# Patient Record
Sex: FEMALE | Race: WHITE | NOT HISPANIC OR LATINO | Employment: FULL TIME | URBAN - NONMETROPOLITAN AREA
[De-identification: names, ages, dates, MRNs, and addresses within clinical notes are randomized per-mention and may not be internally consistent; named-entity substitution may affect disease eponyms.]

---

## 2017-04-11 ENCOUNTER — HOSPITAL ENCOUNTER (OUTPATIENT)
Dept: ULTRASOUND IMAGING | Facility: HOSPITAL | Age: 47
Discharge: HOME | End: 2017-04-11
Admitting: NURSE PRACTITIONER

## 2017-04-11 ENCOUNTER — TRANSCRIBE ORDERS (OUTPATIENT)
Dept: GENERAL RADIOLOGY | Facility: HOSPITAL | Age: 47
End: 2017-04-11

## 2017-04-11 DIAGNOSIS — M79.605 LEFT LEG PAIN: ICD-10-CM

## 2017-04-11 DIAGNOSIS — M79.605 LEFT LEG PAIN: Primary | ICD-10-CM

## 2017-04-11 PROCEDURE — 93971 EXTREMITY STUDY: CPT

## 2019-04-08 ENCOUNTER — OFFICE VISIT (OUTPATIENT)
Dept: OBSTETRICS AND GYNECOLOGY | Facility: CLINIC | Age: 49
End: 2019-04-08

## 2019-04-08 VITALS
HEIGHT: 66 IN | DIASTOLIC BLOOD PRESSURE: 76 MMHG | WEIGHT: 262 LBS | BODY MASS INDEX: 42.11 KG/M2 | SYSTOLIC BLOOD PRESSURE: 138 MMHG

## 2019-04-08 DIAGNOSIS — R87.613 HIGH GRADE SQUAMOUS INTRAEPITHELIAL LESION (HGSIL) ON CYTOLOGIC SMEAR OF CERVIX: Primary | ICD-10-CM

## 2019-04-08 LAB
B-HCG UR QL: NEGATIVE
INTERNAL NEGATIVE CONTROL: NEGATIVE
INTERNAL POSITIVE CONTROL: POSITIVE
Lab: NORMAL

## 2019-04-08 PROCEDURE — 57454 BX/CURETT OF CERVIX W/SCOPE: CPT | Performed by: OBSTETRICS & GYNECOLOGY

## 2019-04-08 PROCEDURE — 88305 TISSUE EXAM BY PATHOLOGIST: CPT | Performed by: OBSTETRICS & GYNECOLOGY

## 2019-04-08 PROCEDURE — 81025 URINE PREGNANCY TEST: CPT | Performed by: OBSTETRICS & GYNECOLOGY

## 2019-04-08 RX ORDER — LANOLIN ALCOHOL/MO/W.PET/CERES
CREAM (GRAM) TOPICAL
Refills: 3 | COMMUNITY
Start: 2019-02-11

## 2019-04-08 RX ORDER — LISINOPRIL AND HYDROCHLOROTHIAZIDE 12.5; 1 MG/1; MG/1
1 TABLET ORAL DAILY
Refills: 0 | COMMUNITY
Start: 2019-03-19

## 2019-04-08 RX ORDER — ERGOCALCIFEROL 1.25 MG/1
50000 CAPSULE ORAL WEEKLY
COMMUNITY

## 2019-04-08 RX ORDER — TIZANIDINE 2 MG/1
TABLET ORAL
Refills: 0 | COMMUNITY
Start: 2019-03-19

## 2019-04-08 RX ORDER — MULTIVIT WITH MINERALS/LUTEIN
1000 TABLET ORAL DAILY
COMMUNITY

## 2019-04-08 NOTE — PROGRESS NOTES
"Isatu Ferguson is a 48 y.o. female  here today for colposcopy.  Her most recent Pap smear was read as ACUS + HR HPV  Her partner has had a vasectomy and her last menstrual period was 3-.    /76   Ht 167.6 cm (66\")   Wt 119 kg (262 lb)   LMP 03/29/2019 (Approximate)   BMI 42.29 kg/m²      A urine pregnancy test in the office today was negative.    Colposcopy was performed in the office today.  She was placed in the lithotomy position on the exam table.  A speculum was inserted and the cervix well visualized.  The cervix was cleansed with acetic acid swabs.  Inspection with the colposcope showed the transformation zone on the ectocervix.  It was seen in its entirety.  There were acetowhite lesions noted at 7 o'clock and 12 o'clock.  There was some active metaplasia noted.  Colposcopy was satisfactory. The cervix was anesthetized with Hurricaine spray. Biopsies were taken at 7:00 and 12 o;clock performed.  The biopsy site was made hemostatic with a silver nitrate stick.  An endocervical curettage was performed.    Colposcopic impression: mild dysplasia    We will notify her when the pathology report is available to discuss further care.  We have discussed post colposcopy instructions.    "

## 2019-04-10 LAB
CYTO UR: NORMAL
LAB AP CASE REPORT: NORMAL
LAB AP CLINICAL INFORMATION: NORMAL
PATH REPORT.FINAL DX SPEC: NORMAL
PATH REPORT.GROSS SPEC: NORMAL

## 2019-04-15 ENCOUNTER — OFFICE VISIT (OUTPATIENT)
Dept: OBSTETRICS AND GYNECOLOGY | Facility: CLINIC | Age: 49
End: 2019-04-15

## 2019-04-15 VITALS
HEIGHT: 66 IN | DIASTOLIC BLOOD PRESSURE: 80 MMHG | BODY MASS INDEX: 42.11 KG/M2 | WEIGHT: 262 LBS | SYSTOLIC BLOOD PRESSURE: 128 MMHG

## 2019-04-15 DIAGNOSIS — D06.9 CIN III (CERVICAL INTRAEPITHELIAL NEOPLASIA GRADE III) WITH SEVERE DYSPLASIA: Primary | ICD-10-CM

## 2019-04-15 PROCEDURE — 99213 OFFICE O/P EST LOW 20 MIN: CPT | Performed by: OBSTETRICS & GYNECOLOGY

## 2019-04-15 NOTE — PROGRESS NOTES
Attempted to obtain health maintenance information, patient unable to provide answers for the following items Tdap.

## 2019-04-15 NOTE — PROGRESS NOTES
"Subjective   Isatu Freguson is a 48 y.o. female.     Chief Complaint   Patient presents with   • Results     Here to discuss Colposcopy results.        48 years female para 2 presents to discuss results. Patient previously had a PAP smear on 2- which was resulted as ASCUS, + HR HPV. Patient then had a colposcopy which resulted in JELLY 3. Patient presents back for follow up.        Review of Systems   Genitourinary:        -amenorrhea         Objective   /80 (BP Location: Left arm, Patient Position: Sitting, Cuff Size: Large Adult)   Ht 167.6 cm (66\")   Wt 119 kg (262 lb)   LMP 03/29/2019 (Approximate)   Breastfeeding? No   BMI 42.29 kg/m²   Patient's last menstrual period was 03/29/2019 (approximate).  Physical Exam   Constitutional: She appears well-developed and well-nourished.   HENT:   Head: Normocephalic and atraumatic.   Eyes: EOM are normal.   Neck: Normal range of motion.   Pulmonary/Chest: Effort normal.   Musculoskeletal: Normal range of motion.   Neurological: She is alert.   Skin: Skin is warm and dry.   Psychiatric: She has a normal mood and affect. Her behavior is normal. Judgment normal.   Nursing note and vitals reviewed.    Assessment/Plan   Problems Addressed this Visit     None      Visit Diagnoses     JELLY III (cervical intraepithelial neoplasia grade III) with severe dysplasia    -  Primary      -Patient counseled about management at this time and that according to ASCCP guidelines cervical conization is indicated at this time.   -Discussed with patient normal progression to cancer at this time   -Patient desires second opinion at this time.   -Stressed importance of patient complying with follow up PAP smears and further management.        Jessica Hernandes, DO  "

## 2020-02-21 ENCOUNTER — TRANSCRIBE ORDERS (OUTPATIENT)
Dept: ADMINISTRATIVE | Facility: HOSPITAL | Age: 50
End: 2020-02-21

## 2020-02-21 DIAGNOSIS — Z12.31 SCREENING MAMMOGRAM, ENCOUNTER FOR: Primary | ICD-10-CM

## 2020-02-26 ENCOUNTER — HOSPITAL ENCOUNTER (OUTPATIENT)
Dept: MAMMOGRAPHY | Facility: HOSPITAL | Age: 50
Discharge: HOME | End: 2020-02-26
Admitting: NURSE PRACTITIONER

## 2020-02-26 PROCEDURE — 77063 BREAST TOMOSYNTHESIS BI: CPT

## 2020-02-26 PROCEDURE — 77067 SCR MAMMO BI INCL CAD: CPT

## 2021-02-23 ENCOUNTER — TRANSCRIBE ORDERS (OUTPATIENT)
Dept: ADMINISTRATIVE | Facility: HOSPITAL | Age: 51
End: 2021-02-23

## 2021-02-23 ENCOUNTER — HOSPITAL ENCOUNTER (OUTPATIENT)
Dept: ULTRASOUND IMAGING | Facility: HOSPITAL | Age: 51
Discharge: HOME | End: 2021-02-23
Admitting: NURSE PRACTITIONER

## 2021-02-23 DIAGNOSIS — N92.6 IRREGULAR MENSTRUAL CYCLE: ICD-10-CM

## 2021-02-23 DIAGNOSIS — N92.6 IRREGULAR MENSTRUAL CYCLE: Primary | ICD-10-CM

## 2021-02-23 PROCEDURE — 76830 TRANSVAGINAL US NON-OB: CPT

## 2024-06-04 ENCOUNTER — TELEPHONE (OUTPATIENT)
Dept: HEMATOLOGY | Age: 54
End: 2024-06-04

## 2024-06-04 NOTE — TELEPHONE ENCOUNTER
Called Patient and reminded patient of their appointment on 06/7/2024 and patient confirmed they would be here.

## 2024-06-06 DIAGNOSIS — D72.828 OTHER ELEVATED WHITE BLOOD CELL (WBC) COUNT: Primary | ICD-10-CM

## 2024-06-06 NOTE — PROGRESS NOTES
OP HEMATOLOGY/ONCOLOGY CONSULTATION      Pt Name: Lindsey Curran  YOB: 1970  MRN: [id number]  Referring provider: NATALIE Zapata  Requesting provider: NATALIE Ashraf  Reason for consultation: Elevated WBCs  Date of evaluation: 2024    History Obtained From:  patient, friend, electronic medical record    CHIEF COMPLAINT:    Chief Complaint   Patient presents with    New Patient      elevated white blood cell count     HISTORY OF PRESENT ILLNESS:    Lindsey Curran is a 53 y.o.  female referred to the clinic by NATALIE Ashraf for evaluation of elevated WBCs.  Hematology consultation is performed 2024.    PMH significant for HTN, hypertriglyceridemia, T2DM, obesity, vit D deficiency, leukocytosis, cervical cancer.    Patient denies recurrent infections.  No eczema, rashes, dental concern, arthritis.   Non-smoker.  Has lost nearly 60 lbs intentionally and feels much better, treated with Mounjaro.   HgbA1c improved.  No personal h/o thrombosis.    Review of Prior CBCs (PCP)  2023 CBC: WBC: 11.28, Hgb: 12.4, MCV: 88.2, Platelets: 251,000, ANC: 6.88, ALC: 3.43 (0.7 - 3.10)  2023 CBC: WBC: 11.42, Hgb: 12.4, MCV: 88.4, Platelets: 268,000, ANC: 6.59, ALC: 3.9  2024 CBC: WBC: 11.1, Hgb: 11.6, MCV: 87, Platelets: 263,000, ANC: 6.9, ALC: 3.4  2024 CBC: WBC: 12.77, Hgb: 12.1, MCV: 90.5, Platelets: 276,000, ANC: 7.23, A.46    Labs 2024 (PCP):  CMP: Creatinine 0.72, .1, Calcium 9.7, Total Protein: 7.1  TSH: 4.860  HgbA1c: 5.6  Cholesterol: 228, LDL: 119, HDL: 67, Triglycerides: 243    Findings discussed with patient, likely reactive in nature - Diabetes, metabolic syndrome.  Recommend CRP, Sed rate, PBS, BCR/ABL to evaluate for other causes.  Will not check JAK2/reflexes at this time given improved Hgb 10.91, normal platelets, no prior h/o thrombosis.    Past Medical History:   Diagnosis Date    Cervical cancer (HCC)     age 49    Diabetes

## 2024-07-16 ENCOUNTER — TELEPHONE (OUTPATIENT)
Dept: HEMATOLOGY | Age: 54
End: 2024-07-16

## 2024-07-16 NOTE — TELEPHONE ENCOUNTER
Called Patient and reminded patient of their appointment on 07/19/2024 and patient confirmed they would be here. Reminded patient to just come at appointment time, and to not come at the lab appointment time. Reminded patient that we will not check them in any more than 30 minutes before appointment time.

## 2024-07-19 ENCOUNTER — HOSPITAL ENCOUNTER (OUTPATIENT)
Dept: INFUSION THERAPY | Age: 54
Discharge: HOME | End: 2024-07-19
Payer: COMMERCIAL

## 2024-07-19 ENCOUNTER — OFFICE VISIT (OUTPATIENT)
Dept: HEMATOLOGY | Age: 54
End: 2024-07-19

## 2024-07-19 VITALS
OXYGEN SATURATION: 99 % | HEART RATE: 64 BPM | HEIGHT: 66 IN | TEMPERATURE: 97.6 F | SYSTOLIC BLOOD PRESSURE: 122 MMHG | DIASTOLIC BLOOD PRESSURE: 68 MMHG | WEIGHT: 219 LBS | BODY MASS INDEX: 35.2 KG/M2

## 2024-07-19 DIAGNOSIS — E66.9 OBESITY (BMI 30-39.9): ICD-10-CM

## 2024-07-19 DIAGNOSIS — D72.829 LEUKOCYTOSIS, UNSPECIFIED TYPE: ICD-10-CM

## 2024-07-19 DIAGNOSIS — Z71.89 CARE PLAN DISCUSSED WITH PATIENT: ICD-10-CM

## 2024-07-19 DIAGNOSIS — D72.9 NEUTROPHILIC LEUKOCYTOSIS: Primary | ICD-10-CM

## 2024-07-19 DIAGNOSIS — D72.828 OTHER ELEVATED WHITE BLOOD CELL (WBC) COUNT: ICD-10-CM

## 2024-07-19 LAB
BASOPHILS # BLD: 0.05 K/UL (ref 0.01–0.08)
BASOPHILS NFR BLD: 0.5 % (ref 0.1–1.2)
CRP SERPL HS-MCNC: 1.64 MG/DL (ref 0–0.5)
EOSINOPHIL # BLD: 0.28 K/UL (ref 0.04–0.54)
EOSINOPHIL NFR BLD: 2.6 % (ref 0.7–7)
ERYTHROCYTE [DISTWIDTH] IN BLOOD BY AUTOMATED COUNT: 15 % (ref 11.7–14.4)
ERYTHROCYTE [SEDIMENTATION RATE] IN BLOOD BY WESTERGREN METHOD: 30 MM/HR (ref 0–25)
HCT VFR BLD AUTO: 36.3 % (ref 34.1–44.9)
HGB BLD-MCNC: 12.3 G/DL (ref 11.2–15.7)
LYMPHOCYTES # BLD: 3.31 K/UL (ref 1.18–3.74)
LYMPHOCYTES NFR BLD: 30.3 % (ref 19.3–53.1)
MCH RBC QN AUTO: 30.1 PG (ref 25.6–32.2)
MCHC RBC AUTO-ENTMCNC: 33.9 G/DL (ref 32.3–35.5)
MCV RBC AUTO: 88.8 FL (ref 79.4–94.8)
MONOCYTES # BLD: 0.45 K/UL (ref 0.24–0.82)
MONOCYTES NFR BLD: 4.1 % (ref 4.7–12.5)
NEUTROPHILS # BLD: 6.79 K/UL (ref 1.56–6.13)
NEUTS SEG NFR BLD: 62.2 % (ref 34–71.1)
PLATELET # BLD AUTO: 260 K/UL (ref 182–369)
PMV BLD AUTO: 9.1 FL (ref 7.4–10.4)
RBC # BLD AUTO: 4.09 M/UL (ref 3.93–5.22)
WBC # BLD AUTO: 10.91 K/UL (ref 3.98–10.04)

## 2024-07-19 PROCEDURE — 85025 COMPLETE CBC W/AUTO DIFF WBC: CPT

## 2024-07-19 PROCEDURE — 36415 COLL VENOUS BLD VENIPUNCTURE: CPT

## 2024-07-19 PROCEDURE — 99202 OFFICE O/P NEW SF 15 MIN: CPT

## 2024-07-19 RX ORDER — FERROUS SULFATE 325(65) MG
1 TABLET, DELAYED RELEASE (ENTERIC COATED) ORAL DAILY
COMMUNITY
Start: 2019-02-11

## 2024-07-19 RX ORDER — LISINOPRIL AND HYDROCHLOROTHIAZIDE 12.5; 1 MG/1; MG/1
1 TABLET ORAL DAILY
COMMUNITY
Start: 2019-03-19

## 2024-07-19 RX ORDER — ALLOPURINOL 100 MG/1
200 TABLET ORAL DAILY
COMMUNITY
Start: 2024-05-22 | End: 2024-06-21

## 2024-07-19 RX ORDER — ACETAMINOPHEN 160 MG
2000 TABLET,DISINTEGRATING ORAL DAILY
COMMUNITY

## 2024-07-19 RX ORDER — TIRZEPATIDE 7.5 MG/.5ML
7.5 INJECTION, SOLUTION SUBCUTANEOUS WEEKLY
COMMUNITY
Start: 2024-07-16

## 2024-07-19 RX ORDER — ALLOPURINOL 200 MG/1
200 TABLET ORAL DAILY
COMMUNITY

## 2024-07-19 ASSESSMENT — ENCOUNTER SYMPTOMS
SHORTNESS OF BREATH: 0
COUGH: 0
NAUSEA: 0
VOMITING: 0
WHEEZING: 0
CONSTIPATION: 0
SORE THROAT: 0
TROUBLE SWALLOWING: 0
EYE DISCHARGE: 0
DIARRHEA: 0
ABDOMINAL PAIN: 0
EYE ITCHING: 0

## 2024-08-12 ENCOUNTER — TELEPHONE (OUTPATIENT)
Dept: HEMATOLOGY | Age: 54
End: 2024-08-12

## 2024-08-12 NOTE — TELEPHONE ENCOUNTER
NEELAM Rutledge completed Distress screening follow up call. SW introduced self and explained social workers role and source of support.  Patient states she is tired because she works nights. Patient currently denies needs or concerns. SW encouraged the patient to call if assistance is needed in the future.

## 2024-10-15 ENCOUNTER — TELEPHONE (OUTPATIENT)
Dept: HEMATOLOGY | Age: 54
End: 2024-10-15

## 2024-10-15 NOTE — TELEPHONE ENCOUNTER
Called Patient and reminded patient of their appointment on 10/18/2024 and patient confirmed they would be here. Reminded patient to just come at appointment time, and to not come at the lab appointment time. Reminded patient that we will not check them in any more than 30 minutes before appointment time.  We have now moved to the [facility] cancer center that is located between our old office and the ER at the [facility]. Letting the Pt know that our front entrance faces the  [employer] [place].

## 2024-10-18 ENCOUNTER — OFFICE VISIT (OUTPATIENT)
Dept: HEMATOLOGY | Age: 54
End: 2024-10-18
Payer: COMMERCIAL

## 2024-10-18 ENCOUNTER — HOSPITAL ENCOUNTER (OUTPATIENT)
Dept: INFUSION THERAPY | Age: 54
Discharge: HOME | End: 2024-10-18
Payer: COMMERCIAL

## 2024-10-18 VITALS
DIASTOLIC BLOOD PRESSURE: 70 MMHG | HEIGHT: 66 IN | SYSTOLIC BLOOD PRESSURE: 120 MMHG | TEMPERATURE: 97.8 F | WEIGHT: 223.3 LBS | BODY MASS INDEX: 35.89 KG/M2 | OXYGEN SATURATION: 98 % | HEART RATE: 78 BPM

## 2024-10-18 DIAGNOSIS — D72.828 NEUTROPHILIC LEUKOCYTOSIS: Primary | ICD-10-CM

## 2024-10-18 DIAGNOSIS — E66.9 OBESITY (BMI 30-39.9): ICD-10-CM

## 2024-10-18 DIAGNOSIS — Z71.89 CARE PLAN DISCUSSED WITH PATIENT: ICD-10-CM

## 2024-10-18 DIAGNOSIS — D72.828 NEUTROPHILIC LEUKOCYTOSIS: ICD-10-CM

## 2024-10-18 LAB
BASOPHILS # BLD: 0.06 K/UL (ref 0.01–0.08)
BASOPHILS NFR BLD: 0.5 % (ref 0.1–1.2)
EOSINOPHIL # BLD: 0.29 K/UL (ref 0.04–0.54)
EOSINOPHIL NFR BLD: 2.4 % (ref 0.7–7)
ERYTHROCYTE [DISTWIDTH] IN BLOOD BY AUTOMATED COUNT: 14.6 % (ref 11.7–14.4)
HCT VFR BLD AUTO: 38.3 % (ref 34.1–44.9)
HGB BLD-MCNC: 13.1 G/DL (ref 11.2–15.7)
LYMPHOCYTES # BLD: 3.13 K/UL (ref 1.18–3.74)
LYMPHOCYTES NFR BLD: 26 % (ref 19.3–53.1)
MCH RBC QN AUTO: 29.9 PG (ref 25.6–32.2)
MCHC RBC AUTO-ENTMCNC: 34.2 G/DL (ref 32.3–35.5)
MCV RBC AUTO: 87.4 FL (ref 79.4–94.8)
MONOCYTES # BLD: 0.69 K/UL (ref 0.24–0.82)
MONOCYTES NFR BLD: 5.7 % (ref 4.7–12.5)
NEUTROPHILS # BLD: 7.85 K/UL (ref 1.56–6.13)
NEUTS SEG NFR BLD: 65.1 % (ref 34–71.1)
PLATELET # BLD AUTO: 314 K/UL (ref 182–369)
PMV BLD AUTO: 9.1 FL (ref 7.4–10.4)
RBC # BLD AUTO: 4.38 M/UL (ref 3.93–5.22)
WBC # BLD AUTO: 12.06 K/UL (ref 3.98–10.04)

## 2024-10-18 PROCEDURE — 85025 COMPLETE CBC W/AUTO DIFF WBC: CPT

## 2024-10-18 PROCEDURE — G8417 CALC BMI ABV UP PARAM F/U: HCPCS | Performed by: NURSE PRACTITIONER

## 2024-10-18 PROCEDURE — G8484 FLU IMMUNIZE NO ADMIN: HCPCS | Performed by: NURSE PRACTITIONER

## 2024-10-18 PROCEDURE — 36415 COLL VENOUS BLD VENIPUNCTURE: CPT

## 2024-10-18 PROCEDURE — 1036F TOBACCO NON-USER: CPT | Performed by: NURSE PRACTITIONER

## 2024-10-18 PROCEDURE — 99213 OFFICE O/P EST LOW 20 MIN: CPT | Performed by: NURSE PRACTITIONER

## 2024-10-18 PROCEDURE — G8427 DOCREV CUR MEDS BY ELIG CLIN: HCPCS | Performed by: NURSE PRACTITIONER

## 2024-10-18 PROCEDURE — 99212 OFFICE O/P EST SF 10 MIN: CPT

## 2024-10-18 PROCEDURE — 3017F COLORECTAL CA SCREEN DOC REV: CPT | Performed by: NURSE PRACTITIONER

## 2024-10-18 ASSESSMENT — ENCOUNTER SYMPTOMS
ABDOMINAL PAIN: 0
VOMITING: 0
SHORTNESS OF BREATH: 0
CONSTIPATION: 0
EYE DISCHARGE: 0
TROUBLE SWALLOWING: 0
SORE THROAT: 0
EYE ITCHING: 0
DIARRHEA: 0
NAUSEA: 0
COUGH: 0
WHEEZING: 0

## 2024-10-18 NOTE — PROGRESS NOTES
OP HEMATOLOGY/ONCOLOGY CONSULTATION      Pt Name: Lindsey Curran  YOB: 1970  MRN: [id number]  Referring provider: NATALIE Zapata  Requesting provider: NATALIE Ashraf  Reason for consultation: Elevated WBCs  Date of evaluation: 10/18/2024    History Obtained From:  patient, friend, electronic medical record    CHIEF COMPLAINT:    Chief Complaint   Patient presents with    Follow-up     Neutrophilic leukocytosis       HISTORY OF PRESENT ILLNESS:    Lindsey Curran is a 54 y.o.  female referred to the clinic by NATALIE Ashraf for evaluation of elevated WBCs.  Hematology consultation is performed 2024.    PMH significant for HTN, hypertriglyceridemia, T2DM, obesity, vit D deficiency, leukocytosis, cervical cancer.    Patient denies recurrent infections.  No eczema, rashes, dental concern, arthritis.   Non-smoker.  Has lost nearly 60 lbs intentionally and feels much better, treated with Mounjaro.   HgbA1c improved.  No personal h/o thrombosis.    Review of Prior CBCs (PCP)  2023 CBC: WBC: 11.28, Hgb: 12.4, MCV: 88.2, Platelets: 251,000, ANC: 6.88, ALC: 3.43 (0.7 - 3.10)  2023 CBC: WBC: 11.42, Hgb: 12.4, MCV: 88.4, Platelets: 268,000, ANC: 6.59, ALC: 3.9  2024 CBC: WBC: 11.1, Hgb: 11.6, MCV: 87, Platelets: 263,000, ANC: 6.9, ALC: 3.4  2024 CBC: WBC: 12.77, Hgb: 12.1, MCV: 90.5, Platelets: 276,000, ANC: 7.23, A.46    Labs 2024 (PCP):  CMP: Creatinine 0.72, .1, Calcium 9.7, Total Protein: 7.1  TSH: 4.860  HgbA1c: 5.6  Cholesterol: 228, LDL: 119, HDL: 67, Triglycerides: 243    Findings discussed with patient, likely reactive in nature - Diabetes, metabolic syndrome.  Recommend CRP, Sed rate, PBS, BCR/ABL to evaluate for other causes.  Will not check JAK2/reflexes at this time given improved Hgb 10.91, normal platelets, no prior h/o thrombosis.    Past Medical History:   Diagnosis Date    Cervical cancer (HCC)     age 49    Diabetes

## 2024-11-15 ENCOUNTER — TRANSCRIBE ORDERS (OUTPATIENT)
Dept: ADMINISTRATIVE | Facility: HOSPITAL | Age: 54
End: 2024-11-15
Payer: COMMERCIAL

## 2024-11-15 DIAGNOSIS — Z12.31 ENCOUNTER FOR SCREENING MAMMOGRAM FOR MALIGNANT NEOPLASM OF BREAST: Primary | ICD-10-CM

## 2024-11-21 ENCOUNTER — HOSPITAL ENCOUNTER (OUTPATIENT)
Dept: MAMMOGRAPHY | Facility: HOSPITAL | Age: 54
Discharge: HOME | End: 2024-11-21
Admitting: NURSE PRACTITIONER
Payer: COMMERCIAL

## 2024-11-21 DIAGNOSIS — Z12.31 ENCOUNTER FOR SCREENING MAMMOGRAM FOR MALIGNANT NEOPLASM OF BREAST: ICD-10-CM

## 2024-11-21 PROCEDURE — 77063 BREAST TOMOSYNTHESIS BI: CPT

## 2024-11-21 PROCEDURE — 77067 SCR MAMMO BI INCL CAD: CPT

## 2024-12-01 ENCOUNTER — HOSPITAL ENCOUNTER (INPATIENT)
Facility: HOSPITAL | Age: 54
LOS: 1 days | Discharge: HOME | End: 2024-12-02
Attending: STUDENT IN AN ORGANIZED HEALTH CARE EDUCATION/TRAINING PROGRAM | Admitting: INTERNAL MEDICINE
Payer: COMMERCIAL

## 2024-12-01 ENCOUNTER — APPOINTMENT (OUTPATIENT)
Dept: CT IMAGING | Facility: HOSPITAL | Age: 54
End: 2024-12-01
Payer: COMMERCIAL

## 2024-12-01 ENCOUNTER — APPOINTMENT (OUTPATIENT)
Dept: GENERAL RADIOLOGY | Facility: HOSPITAL | Age: 54
End: 2024-12-01
Payer: COMMERCIAL

## 2024-12-01 DIAGNOSIS — R42 DIZZINESS: Primary | ICD-10-CM

## 2024-12-01 PROBLEM — I10 PRIMARY HYPERTENSION: Status: ACTIVE | Noted: 2024-12-01

## 2024-12-01 PROBLEM — Z86.69 HISTORY OF MIGRAINE HEADACHES: Status: ACTIVE | Noted: 2024-12-01

## 2024-12-01 PROBLEM — R73.03 PRE-DIABETES: Status: ACTIVE | Noted: 2024-12-01

## 2024-12-01 LAB
ABO GROUP BLD: NORMAL
ALBUMIN SERPL-MCNC: 4.5 G/DL (ref 3.5–5.2)
ALBUMIN/GLOB SERPL: 1.3 G/DL
ALP SERPL-CCNC: 28 U/L (ref 39–117)
ALT SERPL W P-5'-P-CCNC: 22 U/L (ref 1–33)
ANION GAP SERPL CALCULATED.3IONS-SCNC: 15 MMOL/L (ref 5–15)
AST SERPL-CCNC: 31 U/L (ref 1–32)
BASOPHILS # BLD AUTO: 0.02 10*3/MM3 (ref 0–0.2)
BASOPHILS NFR BLD AUTO: 0.2 % (ref 0–1.5)
BILIRUB SERPL-MCNC: 1 MG/DL (ref 0–1.2)
BLD GP AB SCN SERPL QL: NEGATIVE
BUN SERPL-MCNC: 18 MG/DL (ref 6–20)
BUN/CREAT SERPL: 34.6 (ref 7–25)
CALCIUM SPEC-SCNC: 9.8 MG/DL (ref 8.6–10.5)
CHLORIDE SERPL-SCNC: 99 MMOL/L (ref 98–107)
CHOLEST SERPL-MCNC: 204 MG/DL (ref 0–200)
CO2 SERPL-SCNC: 22 MMOL/L (ref 22–29)
CREAT SERPL-MCNC: 0.52 MG/DL (ref 0.57–1)
DEPRECATED RDW RBC AUTO: 46 FL (ref 37–54)
EGFRCR SERPLBLD CKD-EPI 2021: 110.6 ML/MIN/1.73
EOSINOPHIL # BLD AUTO: 0.03 10*3/MM3 (ref 0–0.4)
EOSINOPHIL NFR BLD AUTO: 0.3 % (ref 0.3–6.2)
ERYTHROCYTE [DISTWIDTH] IN BLOOD BY AUTOMATED COUNT: 14.9 % (ref 12.3–15.4)
GLOBULIN UR ELPH-MCNC: 3.5 GM/DL
GLUCOSE BLDC GLUCOMTR-MCNC: 110 MG/DL (ref 70–130)
GLUCOSE SERPL-MCNC: 126 MG/DL (ref 65–99)
HBA1C MFR BLD: 5.1 % (ref 4.8–5.6)
HCT VFR BLD AUTO: 36.6 % (ref 34–46.6)
HDLC SERPL-MCNC: 56 MG/DL (ref 40–60)
HGB BLD-MCNC: 12.7 G/DL (ref 12–15.9)
HOLD SPECIMEN: NORMAL
HOLD SPECIMEN: NORMAL
IMM GRANULOCYTES # BLD AUTO: 0.03 10*3/MM3 (ref 0–0.05)
IMM GRANULOCYTES NFR BLD AUTO: 0.3 % (ref 0–0.5)
INR PPP: 0.96 (ref 0.91–1.09)
LDLC SERPL CALC-MCNC: 121 MG/DL (ref 0–100)
LDLC/HDLC SERPL: 2.1 {RATIO}
LYMPHOCYTES # BLD AUTO: 1.79 10*3/MM3 (ref 0.7–3.1)
LYMPHOCYTES NFR BLD AUTO: 17.8 % (ref 19.6–45.3)
MCH RBC QN AUTO: 29.8 PG (ref 26.6–33)
MCHC RBC AUTO-ENTMCNC: 34.7 G/DL (ref 31.5–35.7)
MCV RBC AUTO: 85.9 FL (ref 79–97)
MONOCYTES # BLD AUTO: 0.5 10*3/MM3 (ref 0.1–0.9)
MONOCYTES NFR BLD AUTO: 5 % (ref 5–12)
NEUTROPHILS NFR BLD AUTO: 7.69 10*3/MM3 (ref 1.7–7)
NEUTROPHILS NFR BLD AUTO: 76.4 % (ref 42.7–76)
NRBC BLD AUTO-RTO: 0 /100 WBC (ref 0–0.2)
PLATELET # BLD AUTO: 257 10*3/MM3 (ref 140–450)
PMV BLD AUTO: 9.3 FL (ref 6–12)
POTASSIUM SERPL-SCNC: 4.5 MMOL/L (ref 3.5–5.2)
PROT SERPL-MCNC: 8 G/DL (ref 6–8.5)
PROTHROMBIN TIME: 13.1 SECONDS (ref 11.8–14.8)
RBC # BLD AUTO: 4.26 10*6/MM3 (ref 3.77–5.28)
RH BLD: POSITIVE
SODIUM SERPL-SCNC: 136 MMOL/L (ref 136–145)
T&S EXPIRATION DATE: NORMAL
TRIGL SERPL-MCNC: 152 MG/DL (ref 0–150)
TROPONIN T SERPL HS-MCNC: 6 NG/L
VLDLC SERPL-MCNC: 27 MG/DL (ref 5–40)
WBC NRBC COR # BLD AUTO: 10.06 10*3/MM3 (ref 3.4–10.8)
WHOLE BLOOD HOLD COAG: NORMAL
WHOLE BLOOD HOLD SPECIMEN: NORMAL

## 2024-12-01 PROCEDURE — 70498 CT ANGIOGRAPHY NECK: CPT

## 2024-12-01 PROCEDURE — 86901 BLOOD TYPING SEROLOGIC RH(D): CPT | Performed by: STUDENT IN AN ORGANIZED HEALTH CARE EDUCATION/TRAINING PROGRAM

## 2024-12-01 PROCEDURE — 80053 COMPREHEN METABOLIC PANEL: CPT | Performed by: STUDENT IN AN ORGANIZED HEALTH CARE EDUCATION/TRAINING PROGRAM

## 2024-12-01 PROCEDURE — 85025 COMPLETE CBC W/AUTO DIFF WBC: CPT | Performed by: STUDENT IN AN ORGANIZED HEALTH CARE EDUCATION/TRAINING PROGRAM

## 2024-12-01 PROCEDURE — 86900 BLOOD TYPING SEROLOGIC ABO: CPT | Performed by: STUDENT IN AN ORGANIZED HEALTH CARE EDUCATION/TRAINING PROGRAM

## 2024-12-01 PROCEDURE — 99285 EMERGENCY DEPT VISIT HI MDM: CPT

## 2024-12-01 PROCEDURE — 80061 LIPID PANEL: CPT | Performed by: FAMILY MEDICINE

## 2024-12-01 PROCEDURE — 84484 ASSAY OF TROPONIN QUANT: CPT | Performed by: STUDENT IN AN ORGANIZED HEALTH CARE EDUCATION/TRAINING PROGRAM

## 2024-12-01 PROCEDURE — 25010000002 METOCLOPRAMIDE PER 10 MG: Performed by: STUDENT IN AN ORGANIZED HEALTH CARE EDUCATION/TRAINING PROGRAM

## 2024-12-01 PROCEDURE — 93010 ELECTROCARDIOGRAM REPORT: CPT | Performed by: INTERNAL MEDICINE

## 2024-12-01 PROCEDURE — 70450 CT HEAD/BRAIN W/O DYE: CPT

## 2024-12-01 PROCEDURE — 86850 RBC ANTIBODY SCREEN: CPT | Performed by: STUDENT IN AN ORGANIZED HEALTH CARE EDUCATION/TRAINING PROGRAM

## 2024-12-01 PROCEDURE — 85610 PROTHROMBIN TIME: CPT | Performed by: STUDENT IN AN ORGANIZED HEALTH CARE EDUCATION/TRAINING PROGRAM

## 2024-12-01 PROCEDURE — 83036 HEMOGLOBIN GLYCOSYLATED A1C: CPT | Performed by: FAMILY MEDICINE

## 2024-12-01 PROCEDURE — 71045 X-RAY EXAM CHEST 1 VIEW: CPT

## 2024-12-01 PROCEDURE — 25510000001 IOPAMIDOL PER 1 ML: Performed by: STUDENT IN AN ORGANIZED HEALTH CARE EDUCATION/TRAINING PROGRAM

## 2024-12-01 PROCEDURE — 93005 ELECTROCARDIOGRAM TRACING: CPT | Performed by: STUDENT IN AN ORGANIZED HEALTH CARE EDUCATION/TRAINING PROGRAM

## 2024-12-01 PROCEDURE — 70496 CT ANGIOGRAPHY HEAD: CPT

## 2024-12-01 PROCEDURE — 96374 THER/PROPH/DIAG INJ IV PUSH: CPT

## 2024-12-01 PROCEDURE — 82948 REAGENT STRIP/BLOOD GLUCOSE: CPT

## 2024-12-01 RX ORDER — MECLIZINE HYDROCHLORIDE 25 MG/1
25 TABLET ORAL ONCE
Status: COMPLETED | OUTPATIENT
Start: 2024-12-01 | End: 2024-12-01

## 2024-12-01 RX ORDER — DIAZEPAM 10 MG/2ML
2.5 INJECTION, SOLUTION INTRAMUSCULAR; INTRAVENOUS ONCE
Status: COMPLETED | OUTPATIENT
Start: 2024-12-02 | End: 2024-12-02

## 2024-12-01 RX ORDER — BISACODYL 10 MG
10 SUPPOSITORY, RECTAL RECTAL DAILY PRN
Status: DISCONTINUED | OUTPATIENT
Start: 2024-12-01 | End: 2024-12-02 | Stop reason: HOSPADM

## 2024-12-01 RX ORDER — POLYETHYLENE GLYCOL 3350 17 G/17G
17 POWDER, FOR SOLUTION ORAL DAILY PRN
Status: DISCONTINUED | OUTPATIENT
Start: 2024-12-01 | End: 2024-12-02 | Stop reason: HOSPADM

## 2024-12-01 RX ORDER — NITROGLYCERIN 0.4 MG/1
0.4 TABLET SUBLINGUAL
Status: DISCONTINUED | OUTPATIENT
Start: 2024-12-01 | End: 2024-12-02 | Stop reason: HOSPADM

## 2024-12-01 RX ORDER — TIRZEPATIDE 10 MG/.5ML
10 INJECTION, SOLUTION SUBCUTANEOUS WEEKLY
COMMUNITY

## 2024-12-01 RX ORDER — ACETAMINOPHEN 650 MG/1
650 SUPPOSITORY RECTAL EVERY 4 HOURS PRN
Status: DISCONTINUED | OUTPATIENT
Start: 2024-12-01 | End: 2024-12-02 | Stop reason: HOSPADM

## 2024-12-01 RX ORDER — DEXAMETHASONE SODIUM PHOSPHATE 4 MG/ML
4 INJECTION, SOLUTION INTRA-ARTICULAR; INTRALESIONAL; INTRAMUSCULAR; INTRAVENOUS; SOFT TISSUE ONCE
Status: COMPLETED | OUTPATIENT
Start: 2024-12-02 | End: 2024-12-02

## 2024-12-01 RX ORDER — ONDANSETRON 2 MG/ML
4 INJECTION INTRAMUSCULAR; INTRAVENOUS EVERY 6 HOURS PRN
Status: DISCONTINUED | OUTPATIENT
Start: 2024-12-01 | End: 2024-12-02 | Stop reason: HOSPADM

## 2024-12-01 RX ORDER — SODIUM CHLORIDE 0.9 % (FLUSH) 0.9 %
10 SYRINGE (ML) INJECTION AS NEEDED
Status: DISCONTINUED | OUTPATIENT
Start: 2024-12-01 | End: 2024-12-02 | Stop reason: HOSPADM

## 2024-12-01 RX ORDER — INSULIN LISPRO 100 [IU]/ML
2-7 INJECTION, SOLUTION INTRAVENOUS; SUBCUTANEOUS
Status: DISCONTINUED | OUTPATIENT
Start: 2024-12-02 | End: 2024-12-02 | Stop reason: HOSPADM

## 2024-12-01 RX ORDER — METOCLOPRAMIDE HYDROCHLORIDE 5 MG/ML
10 INJECTION INTRAMUSCULAR; INTRAVENOUS ONCE
Status: COMPLETED | OUTPATIENT
Start: 2024-12-01 | End: 2024-12-01

## 2024-12-01 RX ORDER — IBUPROFEN 600 MG/1
1 TABLET ORAL
Status: DISCONTINUED | OUTPATIENT
Start: 2024-12-01 | End: 2024-12-02 | Stop reason: HOSPADM

## 2024-12-01 RX ORDER — ASPIRIN 81 MG/1
81 TABLET, CHEWABLE ORAL ONCE
Status: COMPLETED | OUTPATIENT
Start: 2024-12-01 | End: 2024-12-01

## 2024-12-01 RX ORDER — SODIUM CHLORIDE 0.9 % (FLUSH) 0.9 %
10 SYRINGE (ML) INJECTION AS NEEDED
Status: DISCONTINUED | OUTPATIENT
Start: 2024-12-01 | End: 2024-12-02 | Stop reason: SDUPTHER

## 2024-12-01 RX ORDER — DEXTROSE MONOHYDRATE AND SODIUM CHLORIDE 5; .45 G/100ML; G/100ML
100 INJECTION, SOLUTION INTRAVENOUS CONTINUOUS
Status: DISCONTINUED | OUTPATIENT
Start: 2024-12-02 | End: 2024-12-02

## 2024-12-01 RX ORDER — BISACODYL 5 MG/1
5 TABLET, DELAYED RELEASE ORAL DAILY PRN
Status: DISCONTINUED | OUTPATIENT
Start: 2024-12-01 | End: 2024-12-02 | Stop reason: HOSPADM

## 2024-12-01 RX ORDER — NICOTINE POLACRILEX 4 MG
15 LOZENGE BUCCAL
Status: DISCONTINUED | OUTPATIENT
Start: 2024-12-01 | End: 2024-12-02 | Stop reason: HOSPADM

## 2024-12-01 RX ORDER — DIPHENHYDRAMINE HYDROCHLORIDE 50 MG/ML
25 INJECTION INTRAMUSCULAR; INTRAVENOUS ONCE
Status: COMPLETED | OUTPATIENT
Start: 2024-12-02 | End: 2024-12-02

## 2024-12-01 RX ORDER — AMOXICILLIN 250 MG
2 CAPSULE ORAL 2 TIMES DAILY PRN
Status: DISCONTINUED | OUTPATIENT
Start: 2024-12-01 | End: 2024-12-02 | Stop reason: HOSPADM

## 2024-12-01 RX ORDER — SODIUM CHLORIDE 0.9 % (FLUSH) 0.9 %
10 SYRINGE (ML) INJECTION EVERY 12 HOURS SCHEDULED
Status: DISCONTINUED | OUTPATIENT
Start: 2024-12-02 | End: 2024-12-02 | Stop reason: HOSPADM

## 2024-12-01 RX ORDER — SODIUM CHLORIDE 9 MG/ML
40 INJECTION, SOLUTION INTRAVENOUS AS NEEDED
Status: DISCONTINUED | OUTPATIENT
Start: 2024-12-01 | End: 2024-12-02 | Stop reason: HOSPADM

## 2024-12-01 RX ORDER — ACETAMINOPHEN 160 MG/5ML
650 SOLUTION ORAL EVERY 4 HOURS PRN
Status: DISCONTINUED | OUTPATIENT
Start: 2024-12-01 | End: 2024-12-02 | Stop reason: HOSPADM

## 2024-12-01 RX ORDER — DEXTROSE MONOHYDRATE 25 G/50ML
25 INJECTION, SOLUTION INTRAVENOUS
Status: DISCONTINUED | OUTPATIENT
Start: 2024-12-01 | End: 2024-12-02 | Stop reason: HOSPADM

## 2024-12-01 RX ORDER — ALLOPURINOL 100 MG/1
100 TABLET ORAL DAILY
COMMUNITY

## 2024-12-01 RX ORDER — IOPAMIDOL 755 MG/ML
125 INJECTION, SOLUTION INTRAVASCULAR
Status: COMPLETED | OUTPATIENT
Start: 2024-12-01 | End: 2024-12-01

## 2024-12-01 RX ORDER — ACETAMINOPHEN 325 MG/1
650 TABLET ORAL EVERY 4 HOURS PRN
Status: DISCONTINUED | OUTPATIENT
Start: 2024-12-01 | End: 2024-12-02 | Stop reason: HOSPADM

## 2024-12-01 RX ADMIN — MECLIZINE HYDROCLORIDE 25 MG: 25 TABLET ORAL at 22:09

## 2024-12-01 RX ADMIN — IOPAMIDOL 92 ML: 755 INJECTION, SOLUTION INTRAVENOUS at 20:49

## 2024-12-01 RX ADMIN — ASPIRIN 81 MG: 81 TABLET, CHEWABLE ORAL at 22:09

## 2024-12-01 RX ADMIN — METOCLOPRAMIDE HYDROCHLORIDE 10 MG: 5 INJECTION INTRAMUSCULAR; INTRAVENOUS at 21:14

## 2024-12-02 ENCOUNTER — APPOINTMENT (OUTPATIENT)
Dept: MRI IMAGING | Facility: HOSPITAL | Age: 54
End: 2024-12-02
Payer: COMMERCIAL

## 2024-12-02 VITALS
HEIGHT: 65 IN | DIASTOLIC BLOOD PRESSURE: 59 MMHG | RESPIRATION RATE: 16 BRPM | TEMPERATURE: 98 F | OXYGEN SATURATION: 98 % | BODY MASS INDEX: 36.82 KG/M2 | SYSTOLIC BLOOD PRESSURE: 133 MMHG | WEIGHT: 221 LBS | HEART RATE: 76 BPM

## 2024-12-02 LAB
ANION GAP SERPL CALCULATED.3IONS-SCNC: 14 MMOL/L (ref 5–15)
BASOPHILS # BLD AUTO: 0.02 10*3/MM3 (ref 0–0.2)
BASOPHILS NFR BLD AUTO: 0.2 % (ref 0–1.5)
BUN SERPL-MCNC: 18 MG/DL (ref 6–20)
BUN/CREAT SERPL: 34 (ref 7–25)
CALCIUM SPEC-SCNC: 9.3 MG/DL (ref 8.6–10.5)
CHLORIDE SERPL-SCNC: 99 MMOL/L (ref 98–107)
CO2 SERPL-SCNC: 23 MMOL/L (ref 22–29)
CREAT SERPL-MCNC: 0.53 MG/DL (ref 0.57–1)
DEPRECATED RDW RBC AUTO: 46.3 FL (ref 37–54)
EGFRCR SERPLBLD CKD-EPI 2021: 110.1 ML/MIN/1.73
EOSINOPHIL # BLD AUTO: 0 10*3/MM3 (ref 0–0.4)
EOSINOPHIL NFR BLD AUTO: 0 % (ref 0.3–6.2)
ERYTHROCYTE [DISTWIDTH] IN BLOOD BY AUTOMATED COUNT: 14.7 % (ref 12.3–15.4)
GLUCOSE BLDC GLUCOMTR-MCNC: 162 MG/DL (ref 70–130)
GLUCOSE BLDC GLUCOMTR-MCNC: 175 MG/DL (ref 70–130)
GLUCOSE SERPL-MCNC: 154 MG/DL (ref 65–99)
HCT VFR BLD AUTO: 36.3 % (ref 34–46.6)
HGB BLD-MCNC: 12.2 G/DL (ref 12–15.9)
IMM GRANULOCYTES # BLD AUTO: 0.05 10*3/MM3 (ref 0–0.05)
IMM GRANULOCYTES NFR BLD AUTO: 0.5 % (ref 0–0.5)
LYMPHOCYTES # BLD AUTO: 1 10*3/MM3 (ref 0.7–3.1)
LYMPHOCYTES NFR BLD AUTO: 9.1 % (ref 19.6–45.3)
MCH RBC QN AUTO: 29.3 PG (ref 26.6–33)
MCHC RBC AUTO-ENTMCNC: 33.6 G/DL (ref 31.5–35.7)
MCV RBC AUTO: 87.1 FL (ref 79–97)
MONOCYTES # BLD AUTO: 0.14 10*3/MM3 (ref 0.1–0.9)
MONOCYTES NFR BLD AUTO: 1.3 % (ref 5–12)
NEUTROPHILS NFR BLD AUTO: 88.9 % (ref 42.7–76)
NEUTROPHILS NFR BLD AUTO: 9.79 10*3/MM3 (ref 1.7–7)
NRBC BLD AUTO-RTO: 0 /100 WBC (ref 0–0.2)
PLATELET # BLD AUTO: 243 10*3/MM3 (ref 140–450)
PMV BLD AUTO: 9.3 FL (ref 6–12)
POTASSIUM SERPL-SCNC: 4 MMOL/L (ref 3.5–5.2)
RBC # BLD AUTO: 4.17 10*6/MM3 (ref 3.77–5.28)
SODIUM SERPL-SCNC: 136 MMOL/L (ref 136–145)
WBC NRBC COR # BLD AUTO: 11 10*3/MM3 (ref 3.4–10.8)

## 2024-12-02 PROCEDURE — 25010000002 DIPHENHYDRAMINE PER 50 MG: Performed by: FAMILY MEDICINE

## 2024-12-02 PROCEDURE — 97162 PT EVAL MOD COMPLEX 30 MIN: CPT | Performed by: PHYSICAL THERAPIST

## 2024-12-02 PROCEDURE — 80048 BASIC METABOLIC PNL TOTAL CA: CPT | Performed by: FAMILY MEDICINE

## 2024-12-02 PROCEDURE — 82948 REAGENT STRIP/BLOOD GLUCOSE: CPT

## 2024-12-02 PROCEDURE — 96375 TX/PRO/DX INJ NEW DRUG ADDON: CPT

## 2024-12-02 PROCEDURE — 25010000002 LORAZEPAM PER 2 MG: Performed by: INTERNAL MEDICINE

## 2024-12-02 PROCEDURE — 85025 COMPLETE CBC W/AUTO DIFF WBC: CPT | Performed by: FAMILY MEDICINE

## 2024-12-02 PROCEDURE — 70551 MRI BRAIN STEM W/O DYE: CPT

## 2024-12-02 PROCEDURE — 25010000002 DIAZEPAM PER 5 MG: Performed by: FAMILY MEDICINE

## 2024-12-02 PROCEDURE — 25010000002 DEXAMETHASONE PER 1 MG: Performed by: FAMILY MEDICINE

## 2024-12-02 RX ORDER — LORAZEPAM 2 MG/ML
0.5 INJECTION INTRAMUSCULAR ONCE AS NEEDED
Status: COMPLETED | OUTPATIENT
Start: 2024-12-02 | End: 2024-12-02

## 2024-12-02 RX ORDER — MECLIZINE HCL 25MG 25 MG/1
25 TABLET, CHEWABLE ORAL 3 TIMES DAILY PRN
Qty: 21 TABLET | Refills: 0 | Status: SHIPPED | OUTPATIENT
Start: 2024-12-02 | End: 2024-12-09

## 2024-12-02 RX ADMIN — Medication 10 ML: at 00:21

## 2024-12-02 RX ADMIN — DIAZEPAM 2.5 MG: 5 INJECTION, SOLUTION INTRAMUSCULAR; INTRAVENOUS at 00:21

## 2024-12-02 RX ADMIN — DIPHENHYDRAMINE HYDROCHLORIDE 25 MG: 50 INJECTION, SOLUTION INTRAMUSCULAR; INTRAVENOUS at 00:20

## 2024-12-02 RX ADMIN — LORAZEPAM 0.5 MG: 2 INJECTION INTRAMUSCULAR; INTRAVENOUS at 11:53

## 2024-12-02 RX ADMIN — DEXAMETHASONE SODIUM PHOSPHATE 4 MG: 4 INJECTION, SOLUTION INTRA-ARTICULAR; INTRALESIONAL; INTRAMUSCULAR; INTRAVENOUS; SOFT TISSUE at 00:21

## 2024-12-02 RX ADMIN — DEXTROSE AND SODIUM CHLORIDE 100 ML/HR: 5; 450 INJECTION, SOLUTION INTRAVENOUS at 00:20

## 2024-12-02 NOTE — PLAN OF CARE
Goal Outcome Evaluation:  Plan of Care Reviewed With: patient        Progress: improving  Outcome Evaluation: Pt arrived from ER at approximately 2240, A/Ox4. Pt rested throughout the night with no further c/o nausea. Safety maintained. Call light in reach.

## 2024-12-02 NOTE — DISCHARGE SUMMARY
[facility] Medicine Services  DISCHARGE SUMMARY       Date of Admission: 12/1/2024  Date of Discharge:  12/2/2024  Primary Care Physician: Soni Tracy APRN    Presenting Problem/History of Present Illness:  dizziness    Final Discharge Diagnoses:  Active Hospital Problems    Diagnosis     **Dizziness     Primary hypertension     History of migraine headaches     Pre-diabetes        Consults: none    Procedures Performed: none    Pertinent Test Results:       Imaging Results (All)       Procedure Component Value Units Date/Time    MRI Brain Without Contrast [[id number]] Collected: 12/02/24 1324     Updated: 12/02/24 1329    Narrative:      EXAMINATION: MRI BRAIN WO CONTRAST-     12/2/2024 11:00 AM     HISTORY: Dizziness stroke work up; R42-Dizziness and giddiness     Noncontrast MR imaging of the brain.  Axial and sagittal sequences.     The diffusion-weighted sequence shows no acute ischemia.     T1 and T2-weighted images show a normal appearance of the brain and  ventricles.  Atrophy is not excessive.  The paranasal sinuses are clear.     The sagittal sequence shows a normal appearance of the midline  structures.     The FLAIR sequence shows no signal abnormality.     No brain hemorrhage or abnormal calcification.       Impression:      1. No acute intracranial abnormality is seen.           This report was signed and finalized on 12/2/2024 1:26 PM by Dr. Conrad Armando MD.       XR Chest 1 View [[id number]] Collected: 12/01/24 2143     Updated: 12/01/24 2147    Narrative:      EXAMINATION:  XR CHEST 1 VW-  12/1/2024 8:42 PM     HISTORY: Stroke Protocol (Onset > 12 hrs). Hypertension.     COMPARISON: No comparison study.     TECHNIQUE: Single view AP image.     FINDINGS:  The lungs are expanded bilaterally and clear. The pleural  spaces are clear. Heart size is normal. There are degenerative changes  of the spine and right AC joint. No acute bony abnormality is seen.           Impression:      No active disease is seen.           This report was signed and finalized on 12/1/2024 9:43 PM by Dr. Yosi Hernandez MD.       CT Angiogram Neck [[id number]] Collected: 12/01/24 2103     Updated: 12/01/24 2110    Narrative:      EXAMINATION:  CT ANGIOGRAM NECK-  12/1/2024 7:35 PM     HISTORY: Dizziness and nausea.     COMPARISON : No comparison study.     DLP: 353.16 mGy.cm Automated dosage reduction technique was utilized to  reduce patient dosage.     TECHNIQUE: CT angio was performed of the neck with IV contrast. Coronal,  sagittal and 3-D reconstruction were performed.     INDEPENDENT 3-D WORKSTATION UTILIZED FOR RECONSTRUCTION: No.     VERTEBRAL ARTERIES: The vertebral arteries in the neck are patent  bilaterally with no significant plaque or stenosis.     RIGHT CAROTID ARTERY: There is a small calcified plaque in the right  carotid bifurcation. There is no right carotid stenosis. There is no  evidence of dissection.     LEFT CAROTID ARTERY: There is no left carotid plaque or stenosis. There  is no evidence of dissection.     OTHER FINDINGS: The parotid, submandibular and thyroid glands  demonstrate no focal abnormality. No lymphadenopathy is seen in the  neck. No mass lesions are appreciated in the neck. There are mild  degenerative changes of the cervical spine.          Impression:      1. NASCET criteria utilized.  2. The vertebral arteries are patent in the neck bilaterally without  plaque or stenosis.  3. There is a small calcified plaque in the right carotid bifurcation.  There is no carotid artery stenosis in the neck. There is no evidence of  carotid artery dissection.        The full report of this exam was immediately signed and available to the  emergency room. The patient is currently in the emergency room.     This report was signed and finalized on 12/1/2024 9:07 PM by Dr. Yosi Hernandez MD.       CT Angiogram Head w AI Analysis of LVO [[id number]] Collected: 12/01/24 2059      Updated: 12/01/24 2106    Narrative:      EXAMINATION:  CT ANGIOGRAM HEAD W AI ANALYSIS OF LVO-  12/1/2024 7:35 PM     HISTORY: Neuro deficit, acute, stroke suspected. Dizziness and vomiting.     COMPARISON : No comparison study.     DLP: 353.16 mGy.cm Automated dosage reduction technique was utilized.     TECHNIQUE: CT angio was performed of the brain with IV contrast.  Coronal, sagittal and 3-D images were reconstructed.     INDEPENDENT 3-D WORKSTATION UTILIZED FOR RECONSTRUCTION: YES.     CTA FINDINGS: The vertebral and basilar arteries are patent. There are  patent bilateral anterior inferior and superior cerebellar arteries. The  internal carotid arteries are patent. The anterior, middle and posterior  cerebral arteries are patent. No aneurysms are appreciated. The venous  sinuses are not opacified with contrast.     AI ANALYSIS: No arterial flow density difference is detected.          Impression:      1. No large vessel occlusion or aneurysm.  2. AI analysis demonstrates no arterial flow density difference.        The full report of this exam was immediately signed and available to the  emergency room. The patient is currently in the emergency room.     This report was signed and finalized on 12/1/2024 9:03 PM by Dr. Yosi Hernandez MD.       CT Head Without Contrast Stroke Protocol [[id number]] Collected: 12/01/24 2047     Updated: 12/01/24 2055    Narrative:      EXAMINATION:  CT HEAD WO CONTRAST STROKE PROTOCOL-  12/1/2024 7:33 PM     HISTORY: Dizziness and vomiting. The dizziness started at 5:30 a.m.     TECHNIQUE: Multiple axial images were obtained through the brain without  contrast infusion. Multiplanar images were reconstructed.     DLP: 697.7 mGy.cm Automated dosage reduction technique was utilized to  reduce patient dosage.     COMPARISON: No comparison study.     FINDINGS: There are no hemorrhage, edema or mass effect. The ventricular  system is nondilated. There is a partially empty appearance of  the sella  turcica. The sella turcica is mildly enlarged. The visualized paranasal  sinuses are clear. The mastoid air cells are clear. No calvarial  fracture is seen.          Impression:      1. No hemorrhage, edema or mass effect.  2. Partially empty appearance of the sella turcica with mild enlargement  of the sella turcica. This may be a normal variant. It may also be seen  in patients with intracranial hypertension.        The full report of this exam was immediately signed and available to the  emergency room. The patient is currently in the emergency room. Results  were also discussed with Dr. Fuentes.        This report was signed and finalized on 12/1/2024 8:51 PM by Dr. Yosi Hernandez MD.             LAB RESULTS:      Lab 12/02/24 0437 12/01/24 2032   WBC 11.00* 10.06   HEMOGLOBIN 12.2 12.7   HEMATOCRIT 36.3 36.6   PLATELETS 243 257   NEUTROS ABS 9.79* 7.69*   IMMATURE GRANS (ABS) 0.05 0.03   LYMPHS ABS 1.00 1.79   MONOS ABS 0.14 0.50   EOS ABS 0.00 0.03   MCV 87.1 85.9   PROTIME  --  13.1         Lab 12/02/24  0437 12/01/24 2032   SODIUM 136 136   POTASSIUM 4.0 4.5   CHLORIDE 99 99   CO2 23.0 22.0   ANION GAP 14.0 15.0   BUN 18 18   CREATININE 0.53* 0.52*   EGFR 110.1 110.6   GLUCOSE 154* 126*   CALCIUM 9.3 9.8   HEMOGLOBIN A1C  --  5.10         Lab 12/01/24 2032   TOTAL PROTEIN 8.0   ALBUMIN 4.5   GLOBULIN 3.5   ALT (SGPT) 22   AST (SGOT) 31   BILIRUBIN 1.0   ALK PHOS 28*         Lab 12/01/24 2032   HSTROP T 6   PROTIME 13.1   INR 0.96         Lab 12/01/24 2032   CHOLESTEROL 204*   LDL CHOL 121*   HDL CHOL 56   TRIGLYCERIDES 152*         Lab 12/01/24 2059   ABO TYPING A   RH TYPING Positive   ANTIBODY SCREEN Negative         Brief Urine Lab Results       None          Microbiology Results (last 10 days)       ** No results found for the last 240 hours. **            Hospital Course:   Patient is a 54-year-old female with history of hypertension, migraine headache, borderline diabetes.  She  "presented to the ER on 12/1 due to dizziness with room spinning and nausea during ambulation.  For ongoing issues she presented to the hospital and a code stroke was called per report.  CT of the head as well as CTA of the head and neck did not show any acute issue or flow obstruction.  Reportedly teleneuro in the ER recommend admission for MRI.  MRI was obtained today and also negative for any acute disease.  In the interim patient was having some evidence for vertigo.  She was seen by PT who performed Epley maneuvers with provoke symptoms and then improvement.  Patient is feeling well and I saw her after PT evaluation.  She would like to go home.  Will put her in for discharge home with outpatient PCP follow-up.  Will order for meclizine as needed for vertigo.  See med rec below for discharge.  Seek medical evaluation for any return or worsening of symptoms.      Physical Exam on Discharge:  /59 (BP Location: Right arm, Patient Position: Lying)   Pulse 76   Temp 98 °F (36.7 °C) (Oral)   Resp 16   Ht 165.1 cm (65\")   Wt 100 kg (221 lb)   SpO2 98%   BMI 36.78 kg/m²   Physical Exam  GEN: Awake, alert, interactive, in NAD  HEENT: PERRLA, EOMI, Anicteric, Trachea midline  Lungs:  no wheezing/rales/rhonchi  Heart: RRR, +S1/s2, no rub  ABD: soft, nt/nd, +BS, no guarding/rebound  Extremities: atraumatic, no cyanosis, no edema  Skin: no rashes or lesions  Neuro: AAOx3, no focal deficits  Psych: normal mood & affect    Condition on Discharge: stable/improved    Discharge Disposition:  Home or Self Care    Discharge Medications:     Discharge Medications        New Medications        Instructions Start Date   meclizine 25 MG chewable tablet chewable tablet  Commonly known as: Antivert   25 mg, Oral, 3 Times Daily PRN             Continue These Medications        Instructions Start Date   allopurinol 100 MG tablet  Commonly known as: ZYLOPRIM   100 mg, Oral, Daily      ferrous sulfate 325 (65 FE) MG EC tablet   " Take 1 tablet by mouth Daily With Breakfast.      Garlic 1000 MG capsule   1,000 mg, Daily      lisinopril-hydrochlorothiazide 10-12.5 MG per tablet  Commonly known as: PRINZIDE,ZESTORETIC   1 tablet, Oral, Daily      Mounjaro 10 MG/0.5ML solution auto-injector  Generic drug: Tirzepatide   10 mg, Subcutaneous, Weekly, Fridays      vitamin D3 125 MCG (5000 UT) capsule capsule   5,000 Units, Oral, Daily      vitamin E 1000 UNIT capsule   1,000 Units, Daily               Discharge Diet:   Dietary Orders (From admission, onward)       Start     Ordered    12/01/24 2317  Diet: Cardiac; Healthy Heart (2-3 Na+); Fluid Consistency: Thin (IDDSI 0)  Diet Effective Now        References:    Diet Order Crosswalk   Question Answer Comment   Diets: Cardiac    Cardiac Diet: Healthy Heart (2-3 Na+)    Fluid Consistency: Thin (IDDSI 0)        12/01/24 2317                      Activity at Discharge:   Increase to baseline as tolerated    Follow-up Appointments:   No future appointments.    Test Results Pending at Discharge: none    Electronically signed by River Mir DO, 12/02/24, 16:58 CST.    Time: 28 minutes.

## 2024-12-02 NOTE — ED NOTES
Nursing report ED to floor  Isatu Ferguson  54 y.o.  female    HPI:   Chief Complaint   Patient presents with    Dizziness    Vomiting       Admitting doctor:   Jarad Lugo MD    Consulting provider(s):  Consults       No orders found from 11/2/2024 to 12/2/2024.             Admitting diagnosis:   The encounter diagnosis was Dizziness.    Code status:   Current Code Status       Date Active Code Status Order ID Comments User Context       Not on file            Allergies:   Patient has no known allergies.    Intake and Output  No intake or output data in the 24 hours ending 12/01/24 2230    Weight:       12/01/24 2015   Weight: 102 kg (224 lb)       Most recent vitals:   Vitals:    12/01/24 2019 12/01/24 2102 12/01/24 2116 12/01/24 2146   BP:  117/69 103/69 111/62   BP Location:       Patient Position:       Pulse:  77 78 72   Resp:       Temp:       TempSrc:       SpO2: 100% 98% 100% 96%   Weight:       Height:         Oxygen Therapy: .    Active LDAs/IV Access:   Lines, Drains & Airways       Active LDAs       Name Placement date Placement time Site Days    Peripheral IV 12/01/24 2029 Posterior;Right Hand 12/01/24 2029  Hand  less than 1    Peripheral IV 12/01/24 2038 Anterior;Left Forearm 12/01/24 2038  Forearm  less than 1                    Labs (abnormal labs have a star):   Labs Reviewed   COMPREHENSIVE METABOLIC PANEL - Abnormal; Notable for the following components:       Result Value    Glucose 126 (*)     Creatinine 0.52 (*)     Alkaline Phosphatase 28 (*)     BUN/Creatinine Ratio 34.6 (*)     All other components within normal limits    Narrative:     GFR Normal >60  Chronic Kidney Disease <60  Kidney Failure <15     CBC WITH AUTO DIFFERENTIAL - Abnormal; Notable for the following components:    Neutrophil % 76.4 (*)     Lymphocyte % 17.8 (*)     Neutrophils, Absolute 7.69 (*)     All other components within normal limits   PROTIME-INR - Normal   SINGLE HS TROPONIN T - Normal     Narrative:     High Sensitive Troponin T Reference Range:  <14.0 ng/L- Negative Female for AMI  <22.0 ng/L- Negative Male for AMI  >=14 - Abnormal Female indicating possible myocardial injury.  >=22 - Abnormal Male indicating possible myocardial injury.   Clinicians would have to utilize clinical acumen, EKG, Troponin, and serial changes to determine if it is an Acute Myocardial Infarction or myocardial injury due to an underlying chronic condition.        POCT GLUCOSE FINGERSTICK - Normal   RAINBOW DRAW    Narrative:     The following orders were created for panel order [place] Draw.  Procedure                               Abnormality         Status                     ---------                               -----------         ------                     Green Top (Gel)[[id number]]                                  Final result               Lavender Top[[id number]]                                     Final result               Red Top[[id number]]                                          Final result               Light Blue Top[[id number]]                                   Final result                 Please view results for these tests on the individual orders.   POCT GLUCOSE FINGERSTICK   TYPE AND SCREEN   CBC AND DIFFERENTIAL    Narrative:     The following orders were created for panel order CBC & Differential.  Procedure                               Abnormality         Status                     ---------                               -----------         ------                     CBC Auto Differential[[id number]]        Abnormal            Final result                 Please view results for these tests on the individual orders.   GREEN TOP   LAVENDER TOP   RED TOP   LIGHT BLUE TOP       Meds given in ED:   Medications   sodium chloride 0.9 % flush 10 mL (has no administration in time range)   iopamidol (ISOVUE-370) 76 % injection 125 mL (92 mL Intravenous Given 12/1/24 2049)   metoclopramide (REGLAN) injection  10 mg (10 mg Intravenous Given 12/1/24 2114)   meclizine (ANTIVERT) tablet 25 mg (25 mg Oral Given 12/1/24 2209)   aspirin chewable tablet 81 mg (81 mg Oral Given 12/1/24 2209)           NIH Stroke Scale:  Interval: 2 hrs posttreatment    Isolation/Infection(s):  No active isolations   No active infections     COVID Testing  Collected .not ordered  Resulted .    Nursing report ED to floor:  Mental status: .AxOx4  Ambulatory status: .standby, fall risk for dizziness  Precautions: .    ED nurse phone extentsion- ..

## 2024-12-02 NOTE — ED NOTES
The following fall interventions were initiated:    [x] Patient and/or family given education   [x] Call light within reach and educated on how to use   [x] Bed rails up per protocol    [x] Bed locked and in the lowest position   [] Bed alarm set and on loudest setting   [x] Fall wrist band applied   [] Non skid footwear applied   [x] Room free of clutter   [x] Patient items within reach   [x] Adequate lighting provided  [] Falls sign present    [x] Patient moved closer to nursing station   [] Restraints applied

## 2024-12-02 NOTE — ED PROVIDER NOTES
Subjective   History of Present Illness  This is a 54 y.o F with hx of HTN, in the emergency room for head spinning sensation since 5am today. Last well known 11pm last night. Negative for headache, chest pain, shortness of breath, or focal weakness. NO facial droop or slurred speech.         Review of Systems   Constitutional: Negative.    Respiratory: Negative.     Cardiovascular: Negative.    Genitourinary: Negative.    Musculoskeletal: Negative.    Neurological:  Positive for dizziness. Negative for seizures, syncope and numbness.       Past Medical History:   Diagnosis Date    Abnormal Pap smear of cervix     Hypertension        No Known Allergies    Past Surgical History:   Procedure Laterality Date    CHOLECYSTECTOMY      WISDOM TOOTH EXTRACTION         Family History   Problem Relation Age of Onset    Hypertension Father     Hypertension Mother     No Known Problems Brother     No Known Problems Sister     No Known Problems Son     No Known Problems Daughter     Breast cancer Other     Cancer Other     Ovarian cancer Neg Hx     Uterine cancer Neg Hx     Colon cancer Neg Hx        Social History     Socioeconomic History    Marital status:    Tobacco Use    Smoking status: Never    Smokeless tobacco: Never   Vaping Use    Vaping status: Never Used   Substance and Sexual Activity    Alcohol use: Yes     Comment: rare    Drug use: No    Sexual activity: Yes     Partners: Male     Birth control/protection: None     Comment: partner vas           Objective   Physical Exam  Constitutional:       Appearance: Normal appearance.   HENT:      Head: Normocephalic and atraumatic.      Right Ear: Tympanic membrane normal.      Left Ear: Tympanic membrane normal.   Eyes:      Extraocular Movements: Extraocular movements intact.      Pupils: Pupils are equal, round, and reactive to light.   Cardiovascular:      Rate and Rhythm: Normal rate and regular rhythm.      Heart sounds: No murmur heard.     No friction rub.    Abdominal:      General: Abdomen is flat. Bowel sounds are normal. There is no distension.      Palpations: Abdomen is soft.      Tenderness: There is no abdominal tenderness.   Musculoskeletal:         General: No swelling, tenderness, deformity or signs of injury.   Neurological:      General: No focal deficit present.      Mental Status: She is alert and oriented to person, place, and time. Mental status is at baseline.      Cranial Nerves: No cranial nerve deficit.      Gait: Gait abnormal.         Procedures           ED Course                                                       Medical Decision Making  54 y.o F hx of HTN, borderline diabetes, no previous stroke or vertigo. Positive for concerns of dizziness, vertigenous , last well known last night at 11pm. Because of 24 hour window with persistent symptoms, abnormal gait, although NIH of 0, stroke protocol was initiated. Spoke to neurologist from tele neuro, who recommends CT brain, CT angio, no perfusion, as well as admit for MRI brain and stroke evaluation. Pending work up. PT in agreement with plan. She is not a candidate for TNK/TPA out of the window.     10:21pm reglan given for nausea, vomiting, will give meclizine aspirin as pe neurology recommendations. PT remains stable. CT brain , CTA negative. Blood work negative for actions needed in the ED. PT admitted to Dr. Lindy schaefer accepted admit. She is stable being admitted for neuro work up, persistent dizziness, and potential brain MRI.     Problems Addressed:  Dizziness: complicated acute illness or injury    Amount and/or Complexity of Data Reviewed  Labs: ordered.  Radiology: ordered.  ECG/medicine tests: ordered.    Risk  OTC drugs.  Prescription drug management.  Decision regarding hospitalization.        Final diagnoses:   Dizziness       ED Disposition  ED Disposition       ED Disposition   Decision to Admit    Condition   --    Comment   Level of Care: Remote Telemetry [26]   Diagnosis:  Dizziness [360253]   Admitting Physician: EUGENIO SHAY [[id number]]   Attending Physician: EUGENIO SHYA [[id number]]   Certification: I Certify That Inpatient Hospital Services Are Medically Necessary For Greater Than 2 Midnights                 Soni Tracy, APRN  [address] [place] [address] [phone]    Schedule an appointment as soon as possible for a visit in 1 week(s)           Medication List        New Prescriptions      meclizine 25 MG chewable tablet chewable tablet  Commonly known as: Antivert  Chew 1 tablet 3 (Three) Times a Day As Needed (dizziness, vertigo) for up to 7 days.               Where to Get Your Medications        These medications were sent to [facility]/pharmacy #[id number] - [place], [place] - [address] AT ACROSS FROM [address] - [phone]  - [phone]   [address] [facility] [address]      Phone: [phone]   meclizine 25 MG chewable tablet chewable tablet            Stoney Fuentes MD  12/02/24 1951

## 2024-12-02 NOTE — PLAN OF CARE
Goal Outcome Evaluation: Patient is alert and oriented x 4, up adlib in the room. Had MRI today. On RA, VSS, , tele and Scd's in place. No complaints of pain this shift. EAting and drinking well. Accucheck done per MD order.

## 2024-12-02 NOTE — H&P
[facility] Medicine Services  HISTORY AND PHYSICAL    Date of Admission: 12/1/2024  Primary Care Physician: Soni Tracy APRN    Subjective   Primary Historian: Patient    Chief Complaint: Dizziness    History of Present Illness  54-year-old female with past medical history of hypertension, borderline diabetes (on Mounjaro), migraine headaches presents to the emergency room with complaints of dizziness, nausea and difficulty with ambulation.  She had sudden onset dizziness and nausea yesterday morning that persisted throughout the day.  The symptoms are worse when she tries to stand up and she has not been able to get out of bed or have anything to eat.  Denies fever or associated headache.  She has had mild migraines in the past few times a year that are relieved with a wet towel and rest.  Code stroke was called in the ER and the patient was evaluated by telemetry neurology recommended for admission and MRI brain.  CT brain report is no hemorrhage edema or mass effect and partially empty appearance of the sella turcica and male enlargement of the sella turcica could be normal variant, CTA headache neck no large vessel occlusion, no arterial flow density difference.    Review of Systems   Otherwise complete ROS reviewed and negative except as mentioned in the HPI.    Past Medical History:   Past Medical History:   Diagnosis Date    Abnormal Pap smear of cervix     Hypertension      Past Surgical History:  Past Surgical History:   Procedure Laterality Date    CHOLECYSTECTOMY      WISDOM TOOTH EXTRACTION       Social History:  reports that she has never smoked. She has never used smokeless tobacco. She reports current alcohol use. She reports that she does not use drugs.    Family History: family history includes Breast cancer in an other family member; Cancer in an other family member; Hypertension in her father and mother; No Known Problems in her brother, daughter, sister,  "and son.       Allergies:  No Known Allergies    Medications:  Prior to Admission medications    Medication Sig Start Date End Date Taking? Authorizing Provider   allopurinol (ZYLOPRIM) 100 MG tablet Take 1 tablet by mouth Daily.    Juni Gonzales MD   Black Cohosh 20 MG tablet Take  by mouth.    Juni Gonzales MD   ferrous sulfate 325 (65 FE) MG EC tablet TAKE 1 TABLET BY MOUTH EVERY DAY 2/11/19   Juni Gonzales MD   Garlic 1000 MG capsule Take  by mouth.    Juni Gonzales MD   lisinopril-hydrochlorothiazide (PRINZIDE,ZESTORETIC) 10-12.5 MG per tablet Take 1 tablet by mouth Daily. 3/19/19   Juni Gonzales MD   Tirzepatide (Mounjaro) 10 MG/0.5ML solution auto-injector Inject 10 Units under the skin into the appropriate area as directed 1 (One) Time Per Week.    Juni Gonzales MD   tiZANidine (ZANAFLEX) 2 MG tablet 1 TABLET AS NEEDED AT BEDTIME ORALLY 90 DAYS 3/19/19   Juni Gonzales MD   vitamin D (ERGOCALCIFEROL) 58574 units capsule capsule Take 50,000 Units by mouth 1 (One) Time Per Week.    Juni Gonzales MD   vitamin E 1000 UNIT capsule Take 1 capsule by mouth Daily.    Juni Gonzalse MD     I have utilized all available immediate resources to obtain, update, or review the patient's current medications (including all prescriptions, over-the-counter products, herbals, cannabis/cannabidiol products, and vitamin/mineral/dietary (nutritional) supplements).    Objective     Vital Signs: /67   Pulse 71   Temp 97.7 °F (36.5 °C) (Oral)   Resp 18   Ht 167.6 cm (66\")   Wt 102 kg (224 lb)   SpO2 95%   BMI 36.15 kg/m²   Physical Exam  Vitals reviewed.   Constitutional:       General: She is not in acute distress.     Appearance: She is well-developed. She is not toxic-appearing.      Comments: Seen-year-old female resting in bed with dark lights and had wet towel over her eyes.  She feels dizzy and a little improved from admission.   HENT:      " Head: Normocephalic and atraumatic.      Right Ear: External ear normal.      Left Ear: External ear normal.      Mouth/Throat:      Mouth: Mucous membranes are dry.      Pharynx: Oropharynx is clear.   Eyes:      General:         Right eye: No discharge.         Left eye: No discharge.      Extraocular Movements: Extraocular movements intact.      Conjunctiva/sclera: Conjunctivae normal.      Pupils: Pupils are equal, round, and reactive to light.   Neck:      Vascular: No JVD.   Cardiovascular:      Rate and Rhythm: Normal rate and regular rhythm.      Pulses: Normal pulses.      Heart sounds: Normal heart sounds. No murmur heard.     No friction rub. No gallop.   Pulmonary:      Effort: Pulmonary effort is normal. No respiratory distress.      Breath sounds: No stridor. No wheezing, rhonchi or rales.   Chest:      Chest wall: No tenderness.   Abdominal:      General: Bowel sounds are normal. There is no distension.      Palpations: Abdomen is soft.      Tenderness: There is no abdominal tenderness. There is no guarding or rebound.      Hernia: No hernia is present.   Musculoskeletal:         General: No swelling, tenderness or deformity. Normal range of motion.      Cervical back: Normal range of motion and neck supple. No rigidity or tenderness. No muscular tenderness.      Right lower leg: No edema.      Left lower leg: No edema.   Skin:     General: Skin is warm and dry.      Findings: No erythema or rash.   Neurological:      General: No focal deficit present.      Mental Status: She is alert and oriented to person, place, and time.      Cranial Nerves: No cranial nerve deficit.      Sensory: No sensory deficit.      Motor: No weakness or abnormal muscle tone.      Deep Tendon Reflexes: Reflexes normal.   Psychiatric:         Mood and Affect: Mood normal.         Behavior: Behavior normal.     Results Reviewed:  Lab Results (last 24 hours)       Procedure Component Value Units Date/Time    Single High  Sensitivity Troponin T [[id number]]  (Normal) Collected: 12/01/24 2032    Specimen: Blood Updated: 12/01/24 2109     HS Troponin T 6 ng/L      Comment: Specimen hemolyzed.  Results may be falsely decreased.       Narrative:      High Sensitive Troponin T Reference Range:  <14.0 ng/L- Negative Female for AMI  <22.0 ng/L- Negative Male for AMI  >=14 - Abnormal Female indicating possible myocardial injury.  >=22 - Abnormal Male indicating possible myocardial injury.   Clinicians would have to utilize clinical acumen, EKG, Troponin, and serial changes to determine if it is an Acute Myocardial Infarction or myocardial injury due to an underlying chronic condition.         Comprehensive Metabolic Panel [[id number]]  (Abnormal) Collected: 12/01/24 2032    Specimen: Blood Updated: 12/01/24 2109     Glucose 126 mg/dL      BUN 18 mg/dL      Creatinine 0.52 mg/dL      Sodium 136 mmol/L      Potassium 4.5 mmol/L      Comment: Specimen hemolyzed.  Result may be falsely elevated.        Chloride 99 mmol/L      CO2 22.0 mmol/L      Calcium 9.8 mg/dL      Total Protein 8.0 g/dL      Albumin 4.5 g/dL      ALT (SGPT) 22 U/L      Comment: Specimen hemolyzed.  Result may  be falsely elevated.        AST (SGOT) 31 U/L      Comment: Specimen hemolyzed.  Result may be falsely elevated.        Alkaline Phosphatase 28 U/L      Total Bilirubin 1.0 mg/dL      Globulin 3.5 gm/dL      A/G Ratio 1.3 g/dL      BUN/Creatinine Ratio 34.6     Anion Gap 15.0 mmol/L      eGFR 110.6 mL/min/1.73     Narrative:      GFR Normal >60  Chronic Kidney Disease <60  Kidney Failure <15      POC Glucose Once [[id number]]  (Normal) Collected: 12/01/24 2038    Specimen: Blood Updated: 12/01/24 2059     Glucose 110 mg/dL      Comment: : [id number] Jeff RebaMeter ID: [id number]       Protime-INR [[id number]]  (Normal) Collected: 12/01/24 2032    Specimen: Blood Updated: 12/01/24 2051     Protime 13.1 Seconds      INR 0.96    [place] Draw [[id number]] Collected:  12/01/24 2032    Specimen: Blood Updated: 12/01/24 2046    Narrative:      The following orders were created for panel order [place] Draw.  Procedure                               Abnormality         Status                     ---------                               -----------         ------                     Green Top (Gel)[[id number]]                                  Final result               Lavender Top[[id number]]                                     Final result               Red Top[[id number]]                                          Final result               Light Blue Top[[id number]]                                   Final result                 Please view results for these tests on the individual orders.    Green Top (Gel) [[id number]] Collected: 12/01/24 2032    Specimen: Blood Updated: 12/01/24 2046     Extra Tube Hold for add-ons.     Comment: Auto resulted.       Lavender Top [[id number]] Collected: 12/01/24 2032    Specimen: Blood Updated: 12/01/24 2046     Extra Tube hold for add-on     Comment: Auto resulted       Red Top [[id number]] Collected: 12/01/24 2032    Specimen: Blood Updated: 12/01/24 2046     Extra Tube Hold for add-ons.     Comment: Auto resulted.       Light Blue Top [[id number]] Collected: 12/01/24 2032    Specimen: Blood Updated: 12/01/24 2046     Extra Tube Hold for add-ons.     Comment: Auto resulted       CBC & Differential [[id number]]  (Abnormal) Collected: 12/01/24 2032    Specimen: Blood Updated: 12/01/24 2042    Narrative:      The following orders were created for panel order CBC & Differential.  Procedure                               Abnormality         Status                     ---------                               -----------         ------                     CBC Auto Differential[[id number]]        Abnormal            Final result                 Please view results for these tests on the individual orders.    CBC Auto Differential [[id number]]  (Abnormal) Collected:  12/01/24 2032    Specimen: Blood Updated: 12/01/24 2042     WBC 10.06 10*3/mm3      RBC 4.26 10*6/mm3      Hemoglobin 12.7 g/dL      Hematocrit 36.6 %      MCV 85.9 fL      MCH 29.8 pg      MCHC 34.7 g/dL      RDW 14.9 %      RDW-SD 46.0 fl      MPV 9.3 fL      Platelets 257 10*3/mm3      Neutrophil % 76.4 %      Lymphocyte % 17.8 %      Monocyte % 5.0 %      Eosinophil % 0.3 %      Basophil % 0.2 %      Immature Grans % 0.3 %      Neutrophils, Absolute 7.69 10*3/mm3      Lymphocytes, Absolute 1.79 10*3/mm3      Monocytes, Absolute 0.50 10*3/mm3      Eosinophils, Absolute 0.03 10*3/mm3      Basophils, Absolute 0.02 10*3/mm3      Immature Grans, Absolute 0.03 10*3/mm3      nRBC 0.0 /100 WBC           Imaging Results (Last 24 Hours)       Procedure Component Value Units Date/Time    XR Chest 1 View [[id number]] Collected: 12/01/24 2143     Updated: 12/01/24 2147    Narrative:      EXAMINATION:  XR CHEST 1 VW-  12/1/2024 8:42 PM     HISTORY: Stroke Protocol (Onset > 12 hrs). Hypertension.     COMPARISON: No comparison study.     TECHNIQUE: Single view AP image.     FINDINGS:  The lungs are expanded bilaterally and clear. The pleural  spaces are clear. Heart size is normal. There are degenerative changes  of the spine and right AC joint. No acute bony abnormality is seen.          Impression:      No active disease is seen.           This report was signed and finalized on 12/1/2024 9:43 PM by Dr. Yosi Hernandez MD.       CT Angiogram Neck [[id number]] Collected: 12/01/24 2103     Updated: 12/01/24 2110    Narrative:      EXAMINATION:  CT ANGIOGRAM NECK-  12/1/2024 7:35 PM     HISTORY: Dizziness and nausea.     COMPARISON : No comparison study.     DLP: 353.16 mGy.cm Automated dosage reduction technique was utilized to  reduce patient dosage.     TECHNIQUE: CT angio was performed of the neck with IV contrast. Coronal,  sagittal and 3-D reconstruction were performed.     INDEPENDENT 3-D WORKSTATION UTILIZED FOR  RECONSTRUCTION: No.     VERTEBRAL ARTERIES: The vertebral arteries in the neck are patent  bilaterally with no significant plaque or stenosis.     RIGHT CAROTID ARTERY: There is a small calcified plaque in the right  carotid bifurcation. There is no right carotid stenosis. There is no  evidence of dissection.     LEFT CAROTID ARTERY: There is no left carotid plaque or stenosis. There  is no evidence of dissection.     OTHER FINDINGS: The parotid, submandibular and thyroid glands  demonstrate no focal abnormality. No lymphadenopathy is seen in the  neck. No mass lesions are appreciated in the neck. There are mild  degenerative changes of the cervical spine.          Impression:      1. NASCET criteria utilized.  2. The vertebral arteries are patent in the neck bilaterally without  plaque or stenosis.  3. There is a small calcified plaque in the right carotid bifurcation.  There is no carotid artery stenosis in the neck. There is no evidence of  carotid artery dissection.        The full report of this exam was immediately signed and available to the  emergency room. The patient is currently in the emergency room.     This report was signed and finalized on 12/1/2024 9:07 PM by Dr. Yosi Hernandez MD.       CT Angiogram Head w AI Analysis of LVO [[id number]] Collected: 12/01/24 2059     Updated: 12/01/24 2106    Narrative:      EXAMINATION:  CT ANGIOGRAM HEAD W AI ANALYSIS OF LVO-  12/1/2024 7:35 PM     HISTORY: Neuro deficit, acute, stroke suspected. Dizziness and vomiting.     COMPARISON : No comparison study.     DLP: 353.16 mGy.cm Automated dosage reduction technique was utilized.     TECHNIQUE: CT angio was performed of the brain with IV contrast.  Coronal, sagittal and 3-D images were reconstructed.     INDEPENDENT 3-D WORKSTATION UTILIZED FOR RECONSTRUCTION: YES.     CTA FINDINGS: The vertebral and basilar arteries are patent. There are  patent bilateral anterior inferior and superior cerebellar arteries.  The  internal carotid arteries are patent. The anterior, middle and posterior  cerebral arteries are patent. No aneurysms are appreciated. The venous  sinuses are not opacified with contrast.     AI ANALYSIS: No arterial flow density difference is detected.          Impression:      1. No large vessel occlusion or aneurysm.  2. AI analysis demonstrates no arterial flow density difference.        The full report of this exam was immediately signed and available to the  emergency room. The patient is currently in the emergency room.     This report was signed and finalized on 12/1/2024 9:03 PM by Dr. Yosi Hernandez MD.       CT Head Without Contrast Stroke Protocol [[id number]] Collected: 12/01/24 2047     Updated: 12/01/24 2055    Narrative:      EXAMINATION:  CT HEAD WO CONTRAST STROKE PROTOCOL-  12/1/2024 7:33 PM     HISTORY: Dizziness and vomiting. The dizziness started at 5:30 a.m.     TECHNIQUE: Multiple axial images were obtained through the brain without  contrast infusion. Multiplanar images were reconstructed.     DLP: 697.7 mGy.cm Automated dosage reduction technique was utilized to  reduce patient dosage.     COMPARISON: No comparison study.     FINDINGS: There are no hemorrhage, edema or mass effect. The ventricular  system is nondilated. There is a partially empty appearance of the sella  turcica. The sella turcica is mildly enlarged. The visualized paranasal  sinuses are clear. The mastoid air cells are clear. No calvarial  fracture is seen.          Impression:      1. No hemorrhage, edema or mass effect.  2. Partially empty appearance of the sella turcica with mild enlargement  of the sella turcica. This may be a normal variant. It may also be seen  in patients with intracranial hypertension.        The full report of this exam was immediately signed and available to the  emergency room. The patient is currently in the emergency room. Results  were also discussed with Dr. Fuentes.        This report  was signed and finalized on 12/1/2024 8:51 PM by Dr. Yosi Hernandez MD.             I have personally reviewed and interpreted the radiology studies and ECG obtained at time of admission.     Assessment / Plan   Assessment:   Active Hospital Problems    Diagnosis     **Dizziness     Primary hypertension     History of migraine headaches     Pre-diabetes      Treatment Plan  The patient will be admitted to my service here at [facility].   Admit to [facility] floor remote telemetry  Vitals and neurochecks every 4 hours  Cardiac diet  IV fluids D5 half-normal saline 100 cc an hour    Dizziness question central versus atypical migraine  Will treat with Valium, dexamethasone and Benadryl x 1  Plan for MRI brain in a.m.  Follow-up with teleneurology recommendations  Aspirin 81 mg daily  Lipid panel and A1c    Hypertension appears to be very well-controlled  Will hold lisinopril/HCTZ due to concern with orthostatic symptoms    Prediabetes versus diabetes  Check A1c  Hypoglycemia could be a concern  IV fluids with dextrose added  Accu-Cheks ACHS  Low-dose Humalog sliding scale  Hypoglycemia protocol    DVT prophylaxis SCDs    Medical Decision Making  Number and Complexity of problems: 4 complex medical problems  Differential Diagnosis: TIA stroke, central vertigo, atypical migraine    Conditions and Status        Condition is unchanged.     [facility] Data  External documents reviewed: [employer] EHR  Cardiac tracing (EKG, telemetry) interpretation: NSR ventricular rate 72 bpm  Radiology interpretation: See above  Labs reviewed: See above  Any tests that were considered but not ordered: None     Decision rules/scores evaluated (example [id number], [place], etc): Not applicable     Discussed with: Patient     Care Planning  Shared decision making: Patient  Code status and discussions: Full code    Disposition  Social Determinants of Health that impact treatment or disposition: None  Estimated length of stay is overnight.     I  confirmed that the patient's advanced care plan is present, code status is documented, and a surrogate decision maker is listed in the patient's medical record.     The patient's surrogate decision maker is martha owens, spouse.     The patient was seen and examined by me on 12/01/2024 at 2254.    Electronically signed by Jarad Lugo MD, 12/01/24, 22:54 CST.

## 2024-12-02 NOTE — THERAPY EVALUATION
Patient Name: Isatu Ferguson  : 1970    MRN: [id number]                              Today's Date: 2024       Admit Date: 2024    Visit Dx:     ICD-10-CM ICD-9-CM   1. Dizziness  R42 780.4     Patient Active Problem List   Diagnosis    Dizziness    Primary hypertension    History of migraine headaches    Pre-diabetes     Past Medical History:   Diagnosis Date    Abnormal Pap smear of cervix     Hypertension      Past Surgical History:   Procedure Laterality Date    CHOLECYSTECTOMY      WISDOM TOOTH EXTRACTION        General Information       Row Name 24 1015          Physical Therapy Time and Intention    Document Type evaluation  sudden onset of dizziness  -MS     Mode of Treatment physical therapy;individual therapy  -MS       Row Name 24 1015          General Information    Patient Profile Reviewed yes  -MS     Prior Level of Function independent:;all household mobility;community mobility;ADL's;driving;work    -MS     Existing Precautions/Restrictions fall  -MS     Barriers to Rehab none identified  -MS       Row Name 24 1015          Living Environment    People in Home spouse;child(cris), adult  -MS       Row Name 24 1015          Home Main Entrance    Number of Stairs, Main Entrance four  -MS     Stair Railings, Main Entrance railings on both sides of stairs  -MS       Row Name 24 1015          Stairs Within Home, Primary    Number of Stairs, Within Home, Primary none  -MS       Row Name 24 1015          Cognition    Orientation Status (Cognition) oriented x 4  -MS       Row Name 24 1015          Safety Issues/Impairments Affecting Functional Mobility    Impairments Affecting Function (Mobility) balance  -MS               User Key  (r) = Recorded By, (t) = Taken By, (c) = Cosigned By      Initials Name Provider Type    MS Jen Scanlon, PT, DPT, NCS Physical Therapist                   Mobility       Row Name 24 1400           Bed Mobility    Bed Mobility rolling left;rolling right;sidelying-sit;sit-sidelying  -MS     Rolling Left [place] (Bed Mobility) independent  -MS     Rolling Right [place] (Bed Mobility) independent  -MS     Sidelying-Sit [place] (Bed Mobility) independent  -MS     Sit-Sidelying [place] (Bed Mobility) independent  -MS       Row Name 12/02/24 1400          Sit-Stand Transfer    Sit-Stand [place] (Transfers) independent  -MS       Row Name 12/02/24 1400          Gait/Stairs (Locomotion)    [place] Level (Gait) contact guard;minimum assist (75% patient effort)  -MS     Distance in Feet (Gait) 200  -MS     Comment, (Gait/Stairs) unsteady prior to Epley preformed. Pt had LOB with head turns to the R  -MS               User Key  (r) = Recorded By, (t) = Taken By, (c) = Cosigned By      Initials Name Provider Type    Jen Campbell, PT, DPT, NCS Physical Therapist                   Obj/Interventions       Row Name 12/02/24 1400          Range of Motion Comprehensive    General Range of Motion bilateral upper extremity ROM WFL;bilateral lower extremity ROM WFL  -MS       Row Name 12/02/24 1400          Strength Comprehensive (MMT)    General Manual Muscle Testing (MMT) Assessment no strength deficits identified  -MS       Row Name 12/02/24 1400          Motor Skills    Motor Skills coordination  -MS     Coordination WFL;upper extremity;lower extremity;finger to nose;heel to shin  -MS       Row Name 12/02/24 1400          Balance    Balance Assessment sitting static balance;sitting dynamic balance;standing static balance;standing dynamic balance  -MS     Static Sitting Balance independent  -MS     Dynamic Sitting Balance independent  -MS     Position, Sitting Balance unsupported;sitting edge of bed  -MS     Static Standing Balance contact guard  -MS     Dynamic Standing Balance contact guard  -MS     Position/Device Used, Standing Balance supported  -MS     Comment, Balance static standing-  romberg sway posteriorly  -MS       Row Name 12/02/24 1400          Sensory Assessment (Somatosensory)    Sensory Assessment (Somatosensory) sensation intact  -MS               User Key  (r) = Recorded By, (t) = Taken By, (c) = Cosigned By      Initials Name Provider Type    Jen Campbell KYREE, PT, DPT, NCS Physical Therapist                   Goals/Plan       Row Name 12/02/24 1552          Bed Mobility Goal 1 (PT)    Activity/Assistive Device (Bed Mobility Goal 1, PT) bed mobility activities, all  -MS     [place] Level/Cues Needed (Bed Mobility Goal 1, PT) independent  -MS     Time Frame (Bed Mobility Goal 1, PT) long term goal (LTG);by discharge  -MS     Progress/Outcomes (Bed Mobility Goal 1, PT) new goal  -MS       Row Name 12/02/24 1552          Transfer Goal 1 (PT)    Activity/Assistive Device (Transfer Goal 1, PT) sit-to-stand/stand-to-sit;bed-to-chair/chair-to-bed  -MS     [place] Level/Cues Needed (Transfer Goal 1, PT) independent  -MS     Time Frame (Transfer Goal 1, PT) long term goal (LTG);by discharge  -MS     Progress/Outcome (Transfer Goal 1, PT) new goal  -MS       Row Name 12/02/24 1552          Gait Training Goal 1 (PT)    Activity/Assistive Device (Gait Training Goal 1, PT) gait (walking locomotion);decrease fall risk;improve balance and speed;increase endurance/gait distance  -MS     [place] Level (Gait Training Goal 1, PT) independent  -MS     Distance (Gait Training Goal 1, PT) 500ft with no LOB or dizziness  -MS     Time Frame (Gait Training Goal 1, PT) long term goal (LTG);by discharge  -MS     Progress/Outcome (Gait Training Goal 1, PT) new goal  -MS       Row Name 12/02/24 1552          Therapy Assessment/Plan (PT)    Planned Therapy Interventions (PT) balance training;bed mobility training;gait training;patient/family education;transfer training  -MS               User Key  (r) = Recorded By, (t) = Taken By, (c) = Cosigned By      Initials Name Provider Type    MS Scanlon  Jen R, PT, DPT, NCS Physical Therapist                   Clinical Impression       Row Name 12/02/24 1400 12/02/24 1015       Pain    Pretreatment Pain Rating 0/10 - no pain  nausea and dizziness with head turns to R  -MS 0/10 - no pain  -MS    Posttreatment Pain Rating 0/10 - no pain  -MS 0/10 - no pain  -MS      Row Name 12/02/24 1400 12/02/24 1015       Plan of Care Review    Plan of Care Reviewed With patient;parent  -MS patient;spouse;parent  -MS    Progress improving  -MS improving  -MS    Outcome Evaluation The patient presents alert and oriented x4 lying in bed. She reports a sudden onset of dizziness and inability to lay on her R side due to dizziness that causes nausea. She has greatly improved since yesterday. She can now lay on her R side. She demonstrates no focal neurological deficits in strength, sensation, or coordination. She is able to ambulate, however she is unsteady and has LOB with head turns to the R. I performed the [place] Hallpix to find her positive for BPPV in the R posterior canal. I was able to perform the Epley and correct this for her. I will check back on her tomorrow if she is still here and see if her symptoms are still resolved. Recommend discharge home with assist.  -MS --      Row Name 12/02/24 1400          Therapy Assessment/Plan (PT)    Patient/Family Therapy Goals Statement (PT) continue with  no dizziness  -MS     Rehab Potential (PT) good  -MS     Criteria for Skilled Interventions Met (PT) yes;meets criteria;skilled treatment is necessary  -MS     Therapy Frequency (PT) daily  -MS     Predicted Duration of Therapy Intervention (PT) until discharge  -MS       Row Name 12/02/24 1400          Positioning and Restraints    Post Treatment Position bed  -MS     In Bed fowlers;call light within reach;encouraged to call for assist;side rails up x2  -MS               User Key  (r) = Recorded By, (t) = Taken By, (c) = Cosigned By      Initials Name Provider Type    MS Scanlon  Jen ADORNO, PT, DPT, NCS Physical Therapist                   Outcome Measures       Row Name 12/02/24 1400 12/02/24 0724       How much help from another person do you currently need...    Turning from your back to your side while in flat bed without using bedrails? 4  -MS 4  -TM    Moving from lying on back to sitting on the side of a flat bed without bedrails? 4  -MS 4  -TM    Moving to and from a bed to a chair (including a wheelchair)? 4  -MS 4  -TM    Standing up from a chair using your arms (e.g., wheelchair, bedside chair)? 3  -MS 4  -TM    Climbing 3-5 steps with a railing? 3  -MS 3  -TM    To walk in hospital room? 3  -MS 3  -TM    AM-PAC 6 Clicks Score (PT) 21  -MS 22  -TM    Highest Level of Mobility Goal 6 --> Walk 10 steps or more  -MS 7 --> Walk 25 feet or more  -TM      Row Name 12/02/24 1400          Functional Assessment    Outcome Measure Options AM-PAC 6 Clicks Basic Mobility (PT)  -MS               User Key  (r) = Recorded By, (t) = Taken By, (c) = Cosigned By      Initials Name Provider Type    MS Jen Scanlon, PT, DPT, NCS Physical Therapist    TM Heidi Hair RN Registered Nurse                                 Physical Therapy Education       Title: PT OT SLP Therapies (In Progress)       Topic: Physical Therapy (In Progress)       Point: Mobility training (In Progress)       Learning Progress Summary            Patient Acceptance, E, NR by MS at 12/2/2024 1026    Comment: role of PT in her care                      Point: Home exercise program (Not Started)       Learner Progress:  Not documented in this visit.              Point: Body mechanics (Not Started)       Learner Progress:  Not documented in this visit.              Point: Precautions (Not Started)       Learner Progress:  Not documented in this visit.                              User Key       Initials Effective Dates Name Provider Type Discipline    MS 07/11/23 -  Jen Scanlon, PT, DPT, NCS Physical Therapist PT                   PT Recommendation and Plan  Planned Therapy Interventions (PT): balance training, bed mobility training, gait training, patient/family education, transfer training  Progress: improving  Outcome Evaluation: The patient presents alert and oriented x4 lying in bed. She reports a sudden onset of dizziness and inability to lay on her R side due to dizziness that causes nausea. She has greatly improved since yesterday. She can now lay on her R side. She demonstrates no focal neurological deficits in strength, sensation, or coordination. She is able to ambulate, however she is unsteady and has LOB with head turns to the R. I performed the [place] Hallpix to find her positive for BPPV in the R posterior canal. I was able to perform the Epley and correct this for her. I will check back on her tomorrow if she is still here and see if her symptoms are still resolved. Recommend discharge home with assist.     Time Calculation:         PT Charges       Row Name 12/02/24 1400             Time Calculation    Start Time 1400  10 min checking on pt earlier  -MS      Stop Time 1448  -MS      Time Calculation (min) 48 min  -MS      PT Received On 12/02/24  -MS      PT Goal Re-Cert Due Date 12/12/24  -MS         Untimed Charges    PT Eval/Re-eval Minutes 58  -MS         Total Minutes    Untimed Charges Total Minutes 58  -MS       Total Minutes 58  -MS                User Key  (r) = Recorded By, (t) = Taken By, (c) = Cosigned By      Initials Name Provider Type    Jen Campbell PT, DPT, NCS Physical Therapist                      PT G-Codes  Outcome Measure Options: AM-PAC 6 Clicks Basic Mobility (PT)  AM-PAC 6 Clicks Score (PT): 21  PT Discharge Summary  Anticipated Discharge Disposition (PT): home with assist    Jen Scanlon, PT, DPT, NCS  12/2/2024

## 2024-12-02 NOTE — ED NOTES
Patient was educated on the signs and symptoms of a transfusion reaction which may include:    Hives  Itching/Rash/Urticarias  Flushing  Chills  Fever (if greater than 1.8 degrees F or 1 degree C from the pre-transfusion baseline temperature And the increased result is a temperature greater than or equal to 100.4 °F)  Nausea/Vomiting  Chest/Abdominal/Back Pain  Pain at the Infusion Site  Headache  Muscle Aches/Myalgia  Dyspnea/Pulmonary Edema/Rales  Cyanosis  Coughing/Wheezing  Shock  Rigors  Hypoxemia  Hypertension  Hypotension  Abnormal Bleeding  Oliguria/Anuria  Hemoglobinuria  Tachycardia    Patient to notify staff if any signs/symptoms occur.  Patient verbalizes understanding. Bracelet placed on pt left wrist next pt ID band

## 2024-12-02 NOTE — PLAN OF CARE
Goal Outcome Evaluation:  Plan of Care Reviewed With: patient, parent        Progress: improving  Outcome Evaluation: The patient presents alert and oriented x4 lying in bed. She reports a sudden onset of dizziness and inability to lay on her R side due to dizziness that causes nausea. She has greatly improved since yesterday. She can now lay on her R side. She demonstrates no focal neurological deficits in strength, sensation, or coordination. She is able to ambulate, however she is unsteady and has LOB with head turns to the R. I performed the [place] Hallpix to find her positive for BPPV in the R posterior canal. I was able to perform the Epley and correct this for her. I will check back on her tomorrow if she is still here and see if her symptoms are still resolved. Recommend discharge home with assist.    Anticipated Discharge Disposition (PT): home with assist

## 2024-12-02 NOTE — PLAN OF CARE
Goal Outcome Evaluation: home with family, AVS reviewed with PT and family

## 2024-12-03 NOTE — THERAPY DISCHARGE NOTE
Acute Care - Physical Therapy Discharge Summary  [facility]       Patient Name: Isatu Ferguson  : 1970  MRN: [id number]    Today's Date: 12/3/2024                 Admit Date: 2024      PT Recommendation and Plan    Visit Dx:    ICD-10-CM ICD-9-CM   1. Dizziness  R42 780.4                PT Rehab Goals       Row Name 24 1100             Bed Mobility Goal 1 (PT)    Activity/Assistive Device (Bed Mobility Goal 1, PT) bed mobility activities, all  -AB      [place] Level/Cues Needed (Bed Mobility Goal 1, PT) independent  -AB      Time Frame (Bed Mobility Goal 1, PT) long term goal (LTG);by discharge  -AB      Progress/Outcomes (Bed Mobility Goal 1, PT) goal not met  -AB         Transfer Goal 1 (PT)    Activity/Assistive Device (Transfer Goal 1, PT) sit-to-stand/stand-to-sit;bed-to-chair/chair-to-bed  -AB      [place] Level/Cues Needed (Transfer Goal 1, PT) independent  -AB      Time Frame (Transfer Goal 1, PT) long term goal (LTG);by discharge  -AB      Progress/Outcome (Transfer Goal 1, PT) goal not met  -AB         Gait Training Goal 1 (PT)    Activity/Assistive Device (Gait Training Goal 1, PT) gait (walking locomotion);decrease fall risk;improve balance and speed;increase endurance/gait distance  -AB      [place] Level (Gait Training Goal 1, PT) independent  -AB      Distance (Gait Training Goal 1, PT) 500ft with no LOB or dizziness  -AB      Time Frame (Gait Training Goal 1, PT) long term goal (LTG);by discharge  -AB      Progress/Outcome (Gait Training Goal 1, PT) goal not met  -AB                User Key  (r) = Recorded By, (t) = Taken By, (c) = Cosigned By      Initials Name Provider Type Discipline    AB Karen Koch, PTA Physical Therapist Assistant PT                        PT Discharge Summary  Anticipated Discharge Disposition (PT): home with assist  Reason for Discharge: Discharge from facility  Outcomes Achieved: Refer to plan of care for updates on goals achieved,  Discharge from facility occurred on same date as evluation  Discharge Destination: Home with assist      Karen Koch, PTA   12/3/2024

## 2024-12-04 LAB
QT INTERVAL: 422 MS
QTC INTERVAL: 462 MS

## 2025-01-17 ENCOUNTER — HOSPITAL ENCOUNTER (OUTPATIENT)
Dept: INFUSION THERAPY | Age: 55
Discharge: HOME | End: 2025-01-17
Payer: COMMERCIAL

## 2025-01-17 DIAGNOSIS — D72.828 OTHER ELEVATED WHITE BLOOD CELL (WBC) COUNT: ICD-10-CM

## 2025-01-17 LAB
BASOPHILS # BLD: 0.04 K/UL (ref 0–0.2)
BASOPHILS NFR BLD: 0.4 % (ref 0–1)
EOSINOPHIL # BLD: 0.12 K/UL (ref 0–0.6)
EOSINOPHIL NFR BLD: 1.1 % (ref 0–5)
ERYTHROCYTE [DISTWIDTH] IN BLOOD BY AUTOMATED COUNT: 14.8 % (ref 11.5–14.5)
HCT VFR BLD AUTO: 36.3 % (ref 37–47)
HGB BLD-MCNC: 12.8 G/DL (ref 12–16)
LYMPHOCYTES # BLD: 3.76 K/UL (ref 1.1–4.5)
LYMPHOCYTES NFR BLD: 35.2 % (ref 20–40)
MCH RBC QN AUTO: 31.1 PG (ref 27–31)
MCHC RBC AUTO-ENTMCNC: 35.3 G/DL (ref 33–37)
MCV RBC AUTO: 88.3 FL (ref 81–99)
MONOCYTES # BLD: 0.66 K/UL (ref 0–0.9)
MONOCYTES NFR BLD: 6.2 % (ref 1–10)
NEUTROPHILS # BLD: 6.07 K/UL (ref 1.5–7.5)
NEUTS SEG NFR BLD: 56.8 % (ref 50–65)
PLATELET # BLD AUTO: 275 K/UL (ref 130–400)
PMV BLD AUTO: 9.2 FL (ref 9.4–12.3)
RBC # BLD AUTO: 4.11 M/UL (ref 4.2–5.4)
WBC # BLD AUTO: 10.68 K/UL (ref 4.8–10.8)

## 2025-01-17 PROCEDURE — 36415 COLL VENOUS BLD VENIPUNCTURE: CPT

## 2025-01-17 PROCEDURE — 85025 COMPLETE CBC W/AUTO DIFF WBC: CPT
